# Patient Record
Sex: FEMALE | Race: WHITE | NOT HISPANIC OR LATINO | Employment: UNEMPLOYED | URBAN - METROPOLITAN AREA
[De-identification: names, ages, dates, MRNs, and addresses within clinical notes are randomized per-mention and may not be internally consistent; named-entity substitution may affect disease eponyms.]

---

## 2021-06-07 ENCOUNTER — HISTORICAL (OUTPATIENT)
Dept: ADMINISTRATIVE | Facility: HOSPITAL | Age: 21
End: 2021-06-07

## 2021-06-07 LAB
ABS NEUT (OLG): 3.78 X10(3)/MCL (ref 2.1–9.2)
ALBUMIN SERPL-MCNC: 4.7 GM/DL (ref 3.5–5)
ALBUMIN/GLOB SERPL: 1.3 RATIO (ref 1.1–2)
ALP SERPL-CCNC: 77 UNIT/L (ref 40–150)
ALT SERPL-CCNC: 25 UNIT/L (ref 0–55)
APPEARANCE, UA: CLEAR
AST SERPL-CCNC: 19 UNIT/L (ref 5–34)
BACTERIA #/AREA URNS AUTO: ABNORMAL /HPF
BASOPHILS # BLD AUTO: 0 X10(3)/MCL (ref 0–0.2)
BASOPHILS NFR BLD AUTO: 0 %
BILIRUB SERPL-MCNC: 0.7 MG/DL
BILIRUB UR QL STRIP: NEGATIVE
BILIRUBIN DIRECT+TOT PNL SERPL-MCNC: 0.3 MG/DL (ref 0–0.5)
BILIRUBIN DIRECT+TOT PNL SERPL-MCNC: 0.4 MG/DL (ref 0–0.8)
BUN SERPL-MCNC: 7 MG/DL (ref 7–18.7)
CALCIUM SERPL-MCNC: 10.2 MG/DL (ref 8.4–10.2)
CHLORIDE SERPL-SCNC: 103 MMOL/L (ref 98–107)
CO2 SERPL-SCNC: 26 MMOL/L (ref 22–29)
COLOR UR: ABNORMAL
CREAT SERPL-MCNC: 0.91 MG/DL (ref 0.55–1.02)
EOSINOPHIL # BLD AUTO: 0.1 X10(3)/MCL (ref 0–0.9)
EOSINOPHIL NFR BLD AUTO: 1 %
ERYTHROCYTE [DISTWIDTH] IN BLOOD BY AUTOMATED COUNT: 12.6 % (ref 11.5–14.5)
EST. AVERAGE GLUCOSE BLD GHB EST-MCNC: 99.7 MG/DL
GLOBULIN SER-MCNC: 3.6 GM/DL (ref 2.4–3.5)
GLUCOSE (UA): NEGATIVE
GLUCOSE SERPL-MCNC: 84 MG/DL (ref 74–100)
HBA1C MFR BLD: 5.1 %
HCT VFR BLD AUTO: 46.1 % (ref 35–46)
HGB BLD-MCNC: 14.9 GM/DL (ref 12–16)
HGB UR QL STRIP: NEGATIVE
HYALINE CASTS #/AREA URNS LPF: ABNORMAL /LPF
IMM GRANULOCYTES # BLD AUTO: 0.01 10*3/UL
IMM GRANULOCYTES NFR BLD AUTO: 0 %
KETONES UR QL STRIP: NEGATIVE
LEUKOCYTE ESTERASE UR QL STRIP: 250 LEU/UL
LYMPHOCYTES # BLD AUTO: 1.5 X10(3)/MCL (ref 0.6–4.6)
LYMPHOCYTES NFR BLD AUTO: 26 %
MCH RBC QN AUTO: 29.3 PG (ref 26–34)
MCHC RBC AUTO-ENTMCNC: 32.3 GM/DL (ref 31–37)
MCV RBC AUTO: 90.7 FL (ref 80–100)
MONOCYTES # BLD AUTO: 0.4 X10(3)/MCL (ref 0.1–1.3)
MONOCYTES NFR BLD AUTO: 7 %
NEUTROPHILS # BLD AUTO: 3.78 X10(3)/MCL (ref 2.1–9.2)
NEUTROPHILS NFR BLD AUTO: 66 %
NITRITE UR QL STRIP: NEGATIVE
NRBC BLD AUTO-RTO: 0 % (ref 0–0.2)
PH UR STRIP: 5.5 [PH] (ref 4.5–8)
PLATELET # BLD AUTO: 300 X10(3)/MCL (ref 130–400)
PMV BLD AUTO: 10.5 FL (ref 7.4–10.4)
POC BETA-HCG (QUAL): NEGATIVE
POTASSIUM SERPL-SCNC: 4.1 MMOL/L (ref 3.5–5.1)
PROT SERPL-MCNC: 8.3 GM/DL (ref 6.4–8.3)
PROT UR QL STRIP: NEGATIVE
RBC # BLD AUTO: 5.08 X10(6)/MCL (ref 4–5.2)
RBC #/AREA URNS AUTO: ABNORMAL /HPF
SODIUM SERPL-SCNC: 140 MMOL/L (ref 136–145)
SP GR UR STRIP: 1.01 (ref 1–1.03)
SQUAMOUS #/AREA URNS LPF: ABNORMAL /LPF
T4 FREE SERPL-MCNC: 0.85 NG/DL (ref 0.7–1.48)
TSH SERPL-ACNC: 2.01 UIU/ML (ref 0.35–4.94)
UROBILINOGEN UR STRIP-ACNC: NORMAL
WBC # SPEC AUTO: 5.8 X10(3)/MCL (ref 4.5–11)
WBC #/AREA URNS AUTO: ABNORMAL /HPF

## 2021-08-03 LAB
C TRACH DNA SPEC QL NAA+PROBE: NEGATIVE
HUMAN PAPILLOMAVIRUS (HPV): NORMAL
PAP RECOMMENDATION EXT: ABNORMAL
PAP SMEAR: ABNORMAL
POC BETA-HCG (QUAL): NEGATIVE

## 2021-11-09 ENCOUNTER — HISTORICAL (OUTPATIENT)
Dept: ADMINISTRATIVE | Facility: HOSPITAL | Age: 21
End: 2021-11-09

## 2021-11-09 LAB
B-HCG SERPL QL: NEGATIVE
POC BETA-HCG (QUAL): NEGATIVE

## 2021-11-29 ENCOUNTER — HISTORICAL (OUTPATIENT)
Dept: ADMINISTRATIVE | Facility: HOSPITAL | Age: 21
End: 2021-11-29

## 2021-11-29 LAB
APPEARANCE, UA: CLEAR
BACTERIA #/AREA URNS AUTO: ABNORMAL /HPF
BILIRUB UR QL STRIP: NEGATIVE
COLOR UR: ABNORMAL
FSH SERPL-ACNC: 4.72 MIU/ML
GLUCOSE (UA): NEGATIVE
HGB UR QL STRIP: NEGATIVE
HYALINE CASTS #/AREA URNS LPF: ABNORMAL /LPF
KETONES UR QL STRIP: NEGATIVE
LEUKOCYTE ESTERASE UR QL STRIP: 75 LEU/UL
LH SERPL-ACNC: 4.74 MIU/ML
NITRITE UR QL STRIP: NEGATIVE
PH UR STRIP: 7.5 [PH] (ref 4.5–8)
POC BETA-HCG (QUAL): NEGATIVE
PROLACTIN LEVEL (OHS): 14.48 NG/ML (ref 5.18–26.53)
PROT UR QL STRIP: NEGATIVE
RBC #/AREA URNS AUTO: ABNORMAL /HPF
SP GR UR STRIP: 1 (ref 1–1.03)
SQUAMOUS #/AREA URNS LPF: ABNORMAL /LPF
UROBILINOGEN UR STRIP-ACNC: NORMAL
WBC #/AREA URNS AUTO: ABNORMAL /HPF

## 2022-03-07 LAB
CHOLEST SERPL-MSCNC: 201 MG/DL (ref 0–200)
HDLC SERPL-MCNC: 51 MG/DL (ref 35–70)
LDLC SERPL CALC-MCNC: 135 MG/DL (ref 0–160)
TRIGL SERPL-MCNC: 76 MG/DL (ref 40–160)

## 2022-04-11 ENCOUNTER — HISTORICAL (OUTPATIENT)
Dept: ADMINISTRATIVE | Facility: HOSPITAL | Age: 22
End: 2022-04-11
Payer: MEDICAID

## 2022-04-28 VITALS
DIASTOLIC BLOOD PRESSURE: 69 MMHG | OXYGEN SATURATION: 100 % | WEIGHT: 213.88 LBS | BODY MASS INDEX: 34.37 KG/M2 | SYSTOLIC BLOOD PRESSURE: 122 MMHG | HEIGHT: 66 IN

## 2022-05-04 ENCOUNTER — HOSPITAL ENCOUNTER (OUTPATIENT)
Dept: RADIOLOGY | Facility: HOSPITAL | Age: 22
Discharge: HOME | End: 2022-05-04
Attending: OBSTETRICS & GYNECOLOGY
Payer: MEDICAID

## 2022-05-04 DIAGNOSIS — N91.5 OLIGOMENORRHEA: ICD-10-CM

## 2022-05-04 PROCEDURE — 76830 TRANSVAGINAL US NON-OB: CPT | Mod: TC

## 2022-05-20 ENCOUNTER — OFFICE VISIT (OUTPATIENT)
Dept: FAMILY MEDICINE | Facility: CLINIC | Age: 22
End: 2022-05-20
Payer: MEDICAID

## 2022-05-20 VITALS
RESPIRATION RATE: 20 BRPM | HEIGHT: 66 IN | DIASTOLIC BLOOD PRESSURE: 75 MMHG | WEIGHT: 218 LBS | BODY MASS INDEX: 35.03 KG/M2 | HEART RATE: 64 BPM | TEMPERATURE: 98 F | SYSTOLIC BLOOD PRESSURE: 105 MMHG | OXYGEN SATURATION: 98 %

## 2022-05-20 DIAGNOSIS — F41.9 ANXIETY: ICD-10-CM

## 2022-05-20 DIAGNOSIS — S93.402A SPRAIN OF LEFT ANKLE, UNSPECIFIED LIGAMENT, INITIAL ENCOUNTER: ICD-10-CM

## 2022-05-20 DIAGNOSIS — R11.0 CHRONIC NAUSEA: ICD-10-CM

## 2022-05-20 PROCEDURE — 3074F SYST BP LT 130 MM HG: CPT | Mod: CPTII,,, | Performed by: NURSE PRACTITIONER

## 2022-05-20 PROCEDURE — 99214 OFFICE O/P EST MOD 30 MIN: CPT | Mod: PBBFAC,PN | Performed by: NURSE PRACTITIONER

## 2022-05-20 PROCEDURE — 1159F MED LIST DOCD IN RCRD: CPT | Mod: CPTII,,, | Performed by: NURSE PRACTITIONER

## 2022-05-20 PROCEDURE — 99213 OFFICE O/P EST LOW 20 MIN: CPT | Mod: S$PBB,,, | Performed by: NURSE PRACTITIONER

## 2022-05-20 PROCEDURE — 3008F BODY MASS INDEX DOCD: CPT | Mod: CPTII,,, | Performed by: NURSE PRACTITIONER

## 2022-05-20 PROCEDURE — 1159F PR MEDICATION LIST DOCUMENTED IN MEDICAL RECORD: ICD-10-PCS | Mod: CPTII,,, | Performed by: NURSE PRACTITIONER

## 2022-05-20 PROCEDURE — 1160F PR REVIEW ALL MEDS BY PRESCRIBER/CLIN PHARMACIST DOCUMENTED: ICD-10-PCS | Mod: CPTII,,, | Performed by: NURSE PRACTITIONER

## 2022-05-20 PROCEDURE — 3074F PR MOST RECENT SYSTOLIC BLOOD PRESSURE < 130 MM HG: ICD-10-PCS | Mod: CPTII,,, | Performed by: NURSE PRACTITIONER

## 2022-05-20 PROCEDURE — 3008F PR BODY MASS INDEX (BMI) DOCUMENTED: ICD-10-PCS | Mod: CPTII,,, | Performed by: NURSE PRACTITIONER

## 2022-05-20 PROCEDURE — 3078F PR MOST RECENT DIASTOLIC BLOOD PRESSURE < 80 MM HG: ICD-10-PCS | Mod: CPTII,,, | Performed by: NURSE PRACTITIONER

## 2022-05-20 PROCEDURE — 99213 PR OFFICE/OUTPT VISIT, EST, LEVL III, 20-29 MIN: ICD-10-PCS | Mod: S$PBB,,, | Performed by: NURSE PRACTITIONER

## 2022-05-20 PROCEDURE — 3078F DIAST BP <80 MM HG: CPT | Mod: CPTII,,, | Performed by: NURSE PRACTITIONER

## 2022-05-20 PROCEDURE — 1160F RVW MEDS BY RX/DR IN RCRD: CPT | Mod: CPTII,,, | Performed by: NURSE PRACTITIONER

## 2022-05-20 RX ORDER — ONDANSETRON 8 MG/1
TABLET, ORALLY DISINTEGRATING ORAL
COMMUNITY
Start: 2022-04-21 | End: 2023-02-16 | Stop reason: SDUPTHER

## 2022-05-20 RX ORDER — BUSPIRONE HYDROCHLORIDE 30 MG/1
30 TABLET ORAL 2 TIMES DAILY
COMMUNITY
Start: 2022-04-21 | End: 2022-08-18 | Stop reason: SDUPTHER

## 2022-05-20 RX ORDER — ESCITALOPRAM OXALATE 5 MG/1
5 TABLET ORAL DAILY
COMMUNITY
Start: 2022-04-21 | End: 2022-08-18 | Stop reason: SDUPTHER

## 2022-05-20 RX ORDER — 1.1% SODIUM FLUORIDE PRESCRIPTION DENTAL CREAM 5 MG/G
CREAM DENTAL
COMMUNITY
Start: 2022-04-23

## 2022-05-20 RX ORDER — OMEPRAZOLE 40 MG/1
40 CAPSULE, DELAYED RELEASE ORAL DAILY
COMMUNITY
Start: 2022-04-21 | End: 2023-02-16 | Stop reason: SDUPTHER

## 2022-05-20 NOTE — ASSESSMENT & PLAN NOTE
Continue Lexapro and Buspirone.  Practice deep breathing or abdominal breathing exercises when anxiety occurs.  Exercise daily. Get sunlight daily.  Avoid caffeine, alcohol and stimulants.  Practice positive phrases and repeat throughout the day, yoga, lavender scents or Chamomile tea will help anxiety.  Set healthy boundaries, avoid people and conversations that increase stress.  Reports any symptoms of suicidal or homicidal ideations immediately, if clinic is closed go to nearest emergency room.

## 2022-05-20 NOTE — PROGRESS NOTES
"Patient Name: Sada Cruz   : 2000  MRN: [id number]     SUBJECTIVE:  Sada Cruz is a 22 y.o. female here for No chief complaint on file.    22:  FU 1 month Anxiety  Patient was started on Lexapro at last visit 1 month ago.  She presents today for follow-up and assessment of Lexapro with buspirone.  Patient states Lexapro is working.  Today she also complains of a left ankle sprain.  She initially sprained her ankle in January.  Continued pain to left lateral foot right above the left lateral malleolus.  Denies swelling.  x-ray performed in January at [facility].  No acute abnormality of left ankle demonstrated on x-ray.  Labs in March show FLP with Chol 201, HDL 51, Trig 76, . All other labs unremarkable.     22: FU  Anxiety: DIMPLE 5 today. Thinks needs increase of Buspirone.  Had period last two days of January then went 41 days without one. pregnancy test today is negative.  Nausea: still has issues with nausea.     22: 3 month FU  Light period in December but not spotting or full blown, no birth control since .  Unable to get her Buspirone 15mg TID due to pharmacy issues.   GERD, omeprazole refilled     21: Urgent Care Follow-up  Went to Urgent Care with symptoms of pregnancy and both urine and serum tests negative.  Hx Anxiety, recently seen 10/5 and put on Buspirone 10mg po TID.   Stopped OCP in , was having regular periods after stopping until last month. Had "spotting" at 10 days late, then spotted for 1.5 days at the end of October. Reports nausea in the morning and at bedtime, denies vomiting. Reports intermittent generalized abdominal aching. Denies diarrhea, constipation, or vomiting.  Next GYN Appt here .    10/5/2021: 1 month follow-up of anxiety.  This is a telehealth visit note. Patient was treated using telehealth, real time audio, video, or both according to [facility] protocols. Sophia RODRIGUEZ FNP-C, conducted the visit from location identified below. " "The patient participated in the visit at a non-[facility] location selected by the patient (or patient's representative), identified below. I am licensed in the [facility]. The patient (or patient's representative) stated that they understood and accepted the privacy and security risks to their information at their location and has consented to telephone visit.  Patient was located at patient's home.  Sophia RODRIGUEZ FNP-C, was located at [facility] [address] [place] [address], [place], [place].  Anxiety: Buspirone TID prescribed last visit. less agitation and not freaking out as much. She has no complaints and states she is doing well today.    9/8/2021: This is a telehealth visit note. Patient was treated using telehealth, real time audio, video, or both according to [facility] protocols. Sophia RODRIGUEZ FNP-C, conducted the visit from location identified below. The patient participated in the visit at a non-[facility] location selected by the patient (or patient's representative), identified below. I am licensed in the [facility]. The patient (or patient's representative) stated that they understood and accepted the privacy and security risks to their information at their location and has consented to telephone visit.  Patient was located at patient's home.  Sophia RODRIGUEZ FNP-C, was located at [address] [place] [address], [place], [place].  Today's visit is follow-up from initial visit.  Was trying to conceive at last visit pregnancy test was negative. She also had one done 8/3 which was negative. Labs reviewed from last visit all WNL.  pt states she has been feeling a lot of anxiety lately and states she has been "freaking out" a lot. neighbor started an argument with her boyfriend and she had bad chest pain and felt anxious because they were arguing. She has mood swings which are normal but no increased agitation, only c/o stress and feelings of worry. She has been off of birth control x 2 months. Has not " been able to conceive.    6/1/2021: Complaints today: Here to establish care.  Believes she is pregnant. LMP 5/21. Stopped BCP 2 weeks ago. Is trying to conceive. has nausea before bed and in AM.  Cancer Screening:  PAP: 2019: normal needs referral to GYN here.  Vaccines:  Tetanus/Tdap: UTD      The patient's allergies, medications, history, and problem list were updated as appropriate.    REVIEW OF SYSTEMS:  A comprehensive review of symptoms was completed and negative except as noted in the HPI.    OBJECTIVE:  Vital signs  There were no vitals filed for this visit.     Physical Exam  Vitals and nursing note reviewed.   HENT:      Head: Normocephalic and atraumatic.   Cardiovascular:      Rate and Rhythm: Normal rate and regular rhythm.      Pulses: Normal pulses.      Heart sounds: Normal heart sounds.   Pulmonary:      Breath sounds: Normal breath sounds.   Musculoskeletal:         General: Normal range of motion.      Cervical back: Normal range of motion.   Skin:     General: Skin is warm and dry.   Neurological:      General: No focal deficit present.      Mental Status: She is alert and oriented to person, place, and time.   Psychiatric:         Mood and Affect: Mood normal.          ASSESSMENT/PLAN:  1. Anxiety  Assessment & Plan:  Continue Lexapro and Buspirone.  Practice deep breathing or abdominal breathing exercises when anxiety occurs.  Exercise daily. Get sunlight daily.  Avoid caffeine, alcohol and stimulants.  Practice positive phrases and repeat throughout the day, yoga, lavender scents or Chamomile tea will help anxiety.  Set healthy boundaries, avoid people and conversations that increase stress.  Reports any symptoms of suicidal or homicidal ideations immediately, if clinic is closed go to nearest emergency room.          2. Chronic nausea  Assessment & Plan:  Awaiting GI appt       No results found for this or any previous visit (from the past 24 hour(s)).      Follow Up:  No follow-ups on file.      This note was created with the assistance of a voice recognition software or phone dictation. There may be transcription errors as a result of using this technology however minimal. Effort has been made to assure accuracy of transcription but any obvious errors or omissions should be clarified with the author of the document

## 2022-05-23 DIAGNOSIS — R63.0 LOSS OF APPETITE: Primary | ICD-10-CM

## 2022-05-31 DIAGNOSIS — N91.5 OLIGOMENORRHEA, UNSPECIFIED TYPE: Primary | ICD-10-CM

## 2022-05-31 NOTE — PROGRESS NOTES
Patient had telemed on 5/25/22 with Gyn res clinic to go over pelvic US & lab results. Pelvic US was completed but the lab work was not. Abstracted the lab orders from [facility] into [employer]. Patient states when she comes in for another US on 6/2/22 she will get the lab work completed then. Abstracted orders are in. Verbal okay to put the orders under Dr. Kira Nieves per Dr. Kira Nieves.

## 2022-06-02 ENCOUNTER — HOSPITAL ENCOUNTER (OUTPATIENT)
Dept: RADIOLOGY | Facility: HOSPITAL | Age: 22
Discharge: HOME | End: 2022-06-02
Attending: INTERNAL MEDICINE
Payer: MEDICAID

## 2022-06-02 DIAGNOSIS — R63.0 LOSS OF APPETITE: ICD-10-CM

## 2022-06-02 PROCEDURE — 76700 US EXAM ABDOM COMPLETE: CPT | Mod: TC

## 2022-06-07 DIAGNOSIS — R63.0 LOSS OF APPETITE: Primary | ICD-10-CM

## 2022-06-07 DIAGNOSIS — R11.0 NAUSEA: ICD-10-CM

## 2022-06-07 DIAGNOSIS — R10.33 PERIUMBILICAL PAIN: ICD-10-CM

## 2022-06-13 ENCOUNTER — HOSPITAL ENCOUNTER (OUTPATIENT)
Dept: RADIOLOGY | Facility: HOSPITAL | Age: 22
Discharge: HOME | End: 2022-06-13
Attending: NURSE PRACTITIONER
Payer: MEDICAID

## 2022-06-13 DIAGNOSIS — R10.33 PERIUMBILICAL PAIN: ICD-10-CM

## 2022-06-13 DIAGNOSIS — R63.0 LOSS OF APPETITE: ICD-10-CM

## 2022-06-13 DIAGNOSIS — R11.0 NAUSEA: ICD-10-CM

## 2022-06-13 PROCEDURE — A9537 TC99M MEBROFENIN: HCPCS

## 2022-06-13 PROCEDURE — 78226 HEPATOBILIARY SYSTEM IMAGING: CPT | Mod: TC

## 2022-06-24 ENCOUNTER — HOSPITAL ENCOUNTER (OUTPATIENT)
Dept: RADIOLOGY | Facility: HOSPITAL | Age: 22
Discharge: HOME | End: 2022-06-24
Attending: FAMILY MEDICINE
Payer: MEDICAID

## 2022-06-24 ENCOUNTER — OFFICE VISIT (OUTPATIENT)
Dept: ORTHOPEDICS | Facility: CLINIC | Age: 22
End: 2022-06-24
Payer: MEDICAID

## 2022-06-24 VITALS
HEIGHT: 69 IN | WEIGHT: 217.19 LBS | BODY MASS INDEX: 32.17 KG/M2 | HEART RATE: 63 BPM | DIASTOLIC BLOOD PRESSURE: 76 MMHG | SYSTOLIC BLOOD PRESSURE: 111 MMHG

## 2022-06-24 DIAGNOSIS — M67.88 LEFT PERONEAL TENDONOSIS: Primary | ICD-10-CM

## 2022-06-24 DIAGNOSIS — M25.572 ACUTE LEFT ANKLE PAIN: ICD-10-CM

## 2022-06-24 DIAGNOSIS — S93.402A SPRAIN OF LEFT ANKLE, UNSPECIFIED LIGAMENT, INITIAL ENCOUNTER: ICD-10-CM

## 2022-06-24 PROCEDURE — 99214 OFFICE O/P EST MOD 30 MIN: CPT | Mod: PBBFAC

## 2022-06-24 PROCEDURE — 73610 X-RAY EXAM OF ANKLE: CPT | Mod: TC,LT

## 2022-06-24 RX ORDER — MELOXICAM 7.5 MG/1
7.5 TABLET ORAL DAILY
Qty: 14 TABLET | Refills: 0 | Status: SHIPPED | OUTPATIENT
Start: 2022-06-24 | End: 2022-07-08

## 2022-06-24 RX ORDER — DICLOFENAC SODIUM 10 MG/G
2 GEL TOPICAL 4 TIMES DAILY PRN
Qty: 100 G | Refills: 3 | Status: SHIPPED | OUTPATIENT
Start: 2022-06-24 | End: 2023-06-12

## 2022-06-24 NOTE — PROGRESS NOTES
Subjective:      Patient ID: Sada Cruz is a 22 y.o. female.    Chief Complaint: Pain of the Left Ankle    HPI  Left ankle pain for 6 months after spraining her ankle while walking her dog. Since then, she notes that her left ankle will sometimes hurt after a full day on her feet. Denies any new trauma, swelling, weakness. Is able to walk without difficulty. Denies tingling/numbness.     Review of Systems   All other systems reviewed and are negative.        Objective:            General    Constitutional: She is oriented to person, place, and time. She appears well-developed and well-nourished.   Neurological: She is alert and oriented to person, place, and time.   Psychiatric: She has a normal mood and affect. Her behavior is normal.     General Musculoskeletal Exam   Gait: normal     Right Ankle/Foot Exam   Right ankle exam is normal.    Left Ankle/Foot Exam     Inspection  Deformity: absent  Effusion: Ankle - absent Foot - absent    Tenderness   The patient is tender to palpation of the peroneals.    Range of Motion   The patient has normal left ankle ROM.     Muscle Strength   The patient has normal left ankle strength.    Tests   Anterior drawer: negative  Varus tilt: negative    Other   Sensation: normal        Xray Left ankle 3 views today (6/24/22)  My impression: No acute osseous abnormalities        Assessment:       Encounter Diagnoses   Name Primary?    Sprain of left ankle, unspecified ligament, initial encounter     Left peroneal tendonosis Yes          Plan:       Sada was seen today for pain.    Diagnoses and all orders for this visit:    Left peroneal tendonosis  -     X-Ray Ankle Complete Left; Future    Sprain of left ankle, unspecified ligament, initial encounter  -     Ambulatory referral/consult to Orthopedics      Dx: left peroneal tendonosis, secondary to prior ankle sprain. Acute in moderate exacerbation. Discussed with patient diagnosis and treatment recommendations. Handout given.    Imaging: radiological studies ordered and independently reviewed; discussed with patient; radiologist interpretation pending .   Treatment Plan: Conservative MELANIA therapy (Protect from further injury, relative rest, Ice or Heat, NSAIDs or Tylenol, Compress and Elevate to reduce swelling, Stretches and Strengthening exercises).   Procedure: will consider CSI in the future if conservative treatments do not improve symptoms  Activity: Activity as tolerated; HEP to include aerobic conditioning with non-painful activity and ROM/STG exercises.   Therapy: Physical Therapy  Medication: start mobic 7.5mg daily. start voltaren gel TID PRN; medication precautions given  RTC: PRN; As needed  Additional Workup: none

## 2022-07-01 NOTE — PROGRESS NOTES
Faculty Attestation: Sada Cruz  was seen in Sports Medicine Clinic. Discussed with Dr. Cline at the time of the visit. History of Present Illness, Physical Exam, and Assessment and Plan reviewed. Treatment plan is reasonable and appropriate. Compliance with treatment recommendations is important.  Radiology images independently reviewed and agree with radiologist interpretation.       Jania Reza MD  Family/Sports Medicine

## 2022-08-18 ENCOUNTER — OFFICE VISIT (OUTPATIENT)
Dept: FAMILY MEDICINE | Facility: CLINIC | Age: 22
End: 2022-08-18
Payer: MEDICAID

## 2022-08-18 VITALS
TEMPERATURE: 99 F | OXYGEN SATURATION: 99 % | WEIGHT: 212 LBS | HEART RATE: 66 BPM | DIASTOLIC BLOOD PRESSURE: 74 MMHG | BODY MASS INDEX: 33.27 KG/M2 | HEIGHT: 67 IN | RESPIRATION RATE: 20 BRPM | SYSTOLIC BLOOD PRESSURE: 103 MMHG

## 2022-08-18 DIAGNOSIS — F41.9 ANXIETY: Primary | ICD-10-CM

## 2022-08-18 DIAGNOSIS — R11.0 CHRONIC NAUSEA: ICD-10-CM

## 2022-08-18 DIAGNOSIS — Z00.00 ENCOUNTER FOR WELLNESS EXAMINATION: ICD-10-CM

## 2022-08-18 PROBLEM — S93.402A LEFT ANKLE SPRAIN: Status: RESOLVED | Noted: 2022-05-20 | Resolved: 2022-08-18

## 2022-08-18 PROCEDURE — 99214 OFFICE O/P EST MOD 30 MIN: CPT | Mod: S$PBB,,, | Performed by: NURSE PRACTITIONER

## 2022-08-18 PROCEDURE — 1159F PR MEDICATION LIST DOCUMENTED IN MEDICAL RECORD: ICD-10-PCS | Mod: CPTII,,, | Performed by: NURSE PRACTITIONER

## 2022-08-18 PROCEDURE — 99214 PR OFFICE/OUTPT VISIT, EST, LEVL IV, 30-39 MIN: ICD-10-PCS | Mod: S$PBB,,, | Performed by: NURSE PRACTITIONER

## 2022-08-18 PROCEDURE — 3074F SYST BP LT 130 MM HG: CPT | Mod: CPTII,,, | Performed by: NURSE PRACTITIONER

## 2022-08-18 PROCEDURE — 1160F PR REVIEW ALL MEDS BY PRESCRIBER/CLIN PHARMACIST DOCUMENTED: ICD-10-PCS | Mod: CPTII,,, | Performed by: NURSE PRACTITIONER

## 2022-08-18 PROCEDURE — 3078F DIAST BP <80 MM HG: CPT | Mod: CPTII,,, | Performed by: NURSE PRACTITIONER

## 2022-08-18 PROCEDURE — 3074F PR MOST RECENT SYSTOLIC BLOOD PRESSURE < 130 MM HG: ICD-10-PCS | Mod: CPTII,,, | Performed by: NURSE PRACTITIONER

## 2022-08-18 PROCEDURE — 1160F RVW MEDS BY RX/DR IN RCRD: CPT | Mod: CPTII,,, | Performed by: NURSE PRACTITIONER

## 2022-08-18 PROCEDURE — 3008F BODY MASS INDEX DOCD: CPT | Mod: CPTII,,, | Performed by: NURSE PRACTITIONER

## 2022-08-18 PROCEDURE — 99214 OFFICE O/P EST MOD 30 MIN: CPT | Mod: PBBFAC,PN | Performed by: NURSE PRACTITIONER

## 2022-08-18 PROCEDURE — 1159F MED LIST DOCD IN RCRD: CPT | Mod: CPTII,,, | Performed by: NURSE PRACTITIONER

## 2022-08-18 PROCEDURE — 3078F PR MOST RECENT DIASTOLIC BLOOD PRESSURE < 80 MM HG: ICD-10-PCS | Mod: CPTII,,, | Performed by: NURSE PRACTITIONER

## 2022-08-18 PROCEDURE — 3008F PR BODY MASS INDEX (BMI) DOCUMENTED: ICD-10-PCS | Mod: CPTII,,, | Performed by: NURSE PRACTITIONER

## 2022-08-18 RX ORDER — BUSPIRONE HYDROCHLORIDE 30 MG/1
30 TABLET ORAL 2 TIMES DAILY
Qty: 60 TABLET | Refills: 6 | Status: SHIPPED | OUTPATIENT
Start: 2022-08-18 | End: 2023-02-16 | Stop reason: SDUPTHER

## 2022-08-18 RX ORDER — ESCITALOPRAM OXALATE 5 MG/1
5 TABLET ORAL DAILY
Qty: 30 TABLET | Refills: 6 | Status: SHIPPED | OUTPATIENT
Start: 2022-08-18 | End: 2023-02-16 | Stop reason: SDUPTHER

## 2022-08-18 RX ORDER — ONDANSETRON 4 MG/1
4 TABLET, ORALLY DISINTEGRATING ORAL EVERY 8 HOURS PRN
COMMUNITY
End: 2022-08-18 | Stop reason: DRUGHIGH

## 2022-08-18 NOTE — PROGRESS NOTES
Patient Name: Sada Cruz   : 2000  MRN: [id number]     SUBJECTIVE:  Sada Cruz is a 22 y.o. female here for Follow-up (3 month f/u, refill)      22: FU visit for assessment of increase in Buspirone and Lexapro.    Lexapro working well and Buspirone increase has helped her.  She has no c/o depression or anxiety today. Is smiling during visit.  States she feels a lot better since her gall bladder was removed recently.  Does have FU scheduled with surgeon at [facility].   States one of her incisions got infected and was not healing correctly.  She is having to pack wound currently. Voices no other complaints today. Most recent labs done in  reviewed and are unremarkable.   Has been to ortho for her ankle.  Issue seems resolved.    Needs refill on her Lexapro and her Buspirone.     22:  FU 1 month Anxiety  Patient was started on Lexapro at last visit 1 month ago.  She presents today for follow-up and assessment of Lexapro with buspirone.  Patient states Lexapro is working.  Today she also complains of a left ankle sprain.  She initially sprained her ankle in January.  Continued pain to left lateral foot right above the left lateral malleolus.  Denies swelling.  x-ray performed in January at [facility].  No acute abnormality of left ankle demonstrated on x-ray.  Labs in March show FLP with Chol 201, HDL 51, Trig 76, . All other labs unremarkable.     22: FU  Anxiety: DIMPLE 5 today. Thinks needs increase of Buspirone.  Had period last two days of January then went 41 days without one. pregnancy test today is negative.  Nausea: still has issues with nausea.     22: 3 month FU  Light period in December but not spotting or full blown, no birth control since .  Unable to get her Buspirone 15mg TID due to pharmacy issues.   GERD, omeprazole refilled     21: Urgent Care Follow-up  Went to Urgent Care with symptoms of pregnancy and both urine and serum tests negative.  Hx Anxiety,  "recently seen 10/5 and put on Buspirone 10mg po TID.   Stopped OCP in June, was having regular periods after stopping until last month. Had "spotting" at 10 days late, then spotted for 1.5 days at the end of October. Reports nausea in the morning and at bedtime, denies vomiting. Reports intermittent generalized abdominal aching. Denies diarrhea, constipation, or vomiting.  Next GYN Appt here 8/22.    10/5/2021: 1 month follow-up of anxiety.  This is a telehealth visit note. Patient was treated using telehealth, real time audio, video, or both according to [facility] protocols. Sophia RODRIGUEZ FNP-C, conducted the visit from location identified below. The patient participated in the visit at a non-OU location selected by the patient (or patient's representative), identified below. I am licensed in the [facility]. The patient (or patient's representative) stated that they understood and accepted the privacy and security risks to their information at their location and has consented to telephone visit.  Patient was located at patient's home.  Sophia RODRIGUEZ FNP-C, was located at [facility] [address].  Anxiety: Buspirone TID prescribed last visit. less agitation and not freaking out as much. She has no complaints and states she is doing well today.    9/8/2021: This is a telehealth visit note. Patient was treated using telehealth, real time audio, video, or both according to [facility] protocols. Sophia RODRIGUEZ FNP-C, conducted the visit from location identified below. The patient participated in the visit at a non-[facility] location selected by the patient (or patient's representative), identified below. I am licensed in the [facility]. The patient (or patient's representative) stated that they understood and accepted the privacy and security risks to their information at their location and has consented to telephone visit.  Patient was located at patient's home.  Sophia RODRIGUEZ, " "LUISKARI, was located at [facility] [address], [place], [place].  Today's visit is follow-up from initial visit.  Was trying to conceive at last visit pregnancy test was negative. She also had one done 8/3 which was negative. Labs reviewed from last visit all WNL.  pt states she has been feeling a lot of anxiety lately and states she has been "freaking out" a lot. neighbor started an argument with her boyfriend and she had bad chest pain and felt anxious because they were arguing. She has mood swings which are normal but no increased agitation, only c/o stress and feelings of worry. She has been off of birth control x 2 months. Has not been able to conceive.    6/1/2021: Complaints today: Here to establish care.  Believes she is pregnant. LMP 5/21. Stopped BCP 2 weeks ago. Is trying to conceive. has nausea before bed and in AM.  Cancer Screening:  PAP: 2019: normal needs referral to GYN here.  Vaccines:  Tetanus/Tdap: UTD            ALLERGIES: Review of patient's allergies indicates:  No Known Allergies      ROS:  Review of Systems   HENT: Negative for hearing loss.    Eyes: Negative for discharge.   Respiratory: Negative for wheezing.    Cardiovascular: Negative for chest pain and palpitations.   Gastrointestinal: Positive for nausea. Negative for blood in stool, constipation, diarrhea and vomiting.   Genitourinary: Negative for dysuria and hematuria.   Musculoskeletal: Negative for neck pain.   Neurological: Negative for weakness and headaches.   Endo/Heme/Allergies: Negative for polydipsia.         OBJECTIVE:  Vital signs  Vitals:    08/18/22 0710   BP: 103/74   BP Location: Left arm   Patient Position: Sitting   BP Method: Medium (Automatic)   Pulse: 66   Resp: 20   Temp: 98.7 °F (37.1 °C)   TempSrc: Oral   SpO2: 99%   Weight: 96.2 kg (212 lb)   Height: 5' 7" (1.702 m)      Body mass index is 33.2 kg/m².    PHYSICAL EXAM:   Physical Exam  Vitals and nursing note reviewed.   HENT:      Head: " Normocephalic and atraumatic.   Cardiovascular:      Rate and Rhythm: Normal rate and regular rhythm.      Pulses: Normal pulses.      Heart sounds: Normal heart sounds.   Pulmonary:      Breath sounds: Normal breath sounds.   Musculoskeletal:         General: Normal range of motion.      Cervical back: Normal range of motion.   Skin:     General: Skin is warm and dry.   Neurological:      General: No focal deficit present.      Mental Status: She is alert and oriented to person, place, and time.   Psychiatric:         Mood and Affect: Mood normal.          ASSESSMENT/PLAN:  1. Anxiety  Assessment & Plan:  Continue Lexapro 5mg and Buspirone 30mg BID  Practice deep breathing or abdominal breathing exercises when anxiety occurs.  Exercise daily. Get sunlight daily.  Avoid caffeine, alcohol and stimulants.  Practice positive phrases and repeat throughout the day, yoga, lavender scents or Chamomile tea will help anxiety.  Set healthy boundaries, avoid people and conversations that increase stress.  Reports any symptoms of suicidal or homicidal ideations immediately, if clinic is closed go to nearest emergency room.        Orders:  -     EScitalopram oxalate (LEXAPRO) 5 MG Tab  -     busPIRone (BUSPAR) 30 MG Tab    2. Chronic nausea  Assessment & Plan:  FU with GI for further mgmt of nausea.       3. Encounter for wellness examination  -     CBC Auto Differential  -     Comprehensive Metabolic Panel  -     TSH  -     T4, Free  -     Lipid Panel  -     Urinalysis  -     Hemoglobin A1C  -     Hepatitis Panel, Acute  -     Hepatitis B Core Antibody, Total  -     Hepatitis B Surface Antigen  -     HIV 1/2 Ag/Ab (4th Gen)  -     SYPHILIS ANTIBODY (WITH REFLEX RPR)         RESULTS:  No results found for this or any previous visit (from the past 1008 hour(s)).      Follow Up:  Follow up in about 6 months (around 2/18/2023) for Follow-up.         This note was created with the assistance of a voice recognition software or phone  dictation. There may be transcription errors as a result of using this technology however minimal. Effort has been made to assure accuracy of transcription but any obvious errors or omissions should be clarified with the author of the document

## 2022-08-18 NOTE — ASSESSMENT & PLAN NOTE
Continue Lexapro 5mg and Buspirone 30mg BID  Practice deep breathing or abdominal breathing exercises when anxiety occurs.  Exercise daily. Get sunlight daily.  Avoid caffeine, alcohol and stimulants.  Practice positive phrases and repeat throughout the day, yoga, lavender scents or Chamomile tea will help anxiety.  Set healthy boundaries, avoid people and conversations that increase stress.  Reports any symptoms of suicidal or homicidal ideations immediately, if clinic is closed go to nearest emergency room.

## 2022-09-12 ENCOUNTER — CLINICAL SUPPORT (OUTPATIENT)
Dept: GYNECOLOGY | Facility: CLINIC | Age: 22
End: 2022-09-12
Payer: MEDICAID

## 2022-09-12 DIAGNOSIS — E28.2 PCOS (POLYCYSTIC OVARIAN SYNDROME): Primary | ICD-10-CM

## 2022-09-12 RX ORDER — METFORMIN HYDROCHLORIDE 500 MG/1
500 TABLET ORAL NIGHTLY
Qty: 90 TABLET | Refills: 3 | Status: SHIPPED | OUTPATIENT
Start: 2022-09-12 | End: 2023-02-16

## 2022-09-12 NOTE — PROGRESS NOTES
Established Patient - Audio Only Telehealth Visit     The patient location is: Home   The chief complaint leading to consultation is: F/u of oligomenorrhea, review of TVUS results   Visit type: Virtual visit with audio only (telephone)  Total time spent with patient: 7 minutes        The reason for the audio only service rather than synchronous audio and video virtual visit was related to technical difficulties or patient preference/necessity.     Each patient to whom I provide medical services by telemedicine is:  (1) informed of the relationship between the physician and patient and the respective role of any other health care provider with respect to management of the patient; and (2) notified that they may decline to receive medical services by telemedicine and may withdraw from such care at any time. Patient verbally consented to receive this service via voice-only telephone call.       HPI:     22 y.o. G0 with oligomenorrhea presenting via telehealth for discussion of TVUS results, today the pt has no complaints. States that since her last visit, she has been experiencing regular monthly menses. Denies any issues with abnormal hair growth, hair loss, voice deepening, or difficult to treat acne. Reports that she has not been successful at losing weight. Is interested in conceiving in the near future but not at this time. No other complaints.    Assessment and plan:   Reviewed TVUS results revealing large polycystic ovaries, and given her hx of oligomenorrhea, pt meets criteria for PCOS.    PCOS  - 2 hr OGTT, lipid panel ordered   - Discussed weight loss strategies for optimization of glucose control and fertility  - Initiated on metformin 500 mg qhs, pt instructed to gradually increase to 1500 daily    - Pt currently with cyclical menses and interested in becoming pregnant in near future, will not initiate Progestin at this time  - Discussed that pt may need ART in order to become pregnant, will revisit at a  later time when patient and partner feel ready to conceive     RTC in 6 months for reevaluation     This service was not originating from a related E/M service provided within the previous 7 days nor will  to an E/M service or procedure within the next 24 hours or my soonest available appointment.  Prevailing standard of care was able to be met in this audio-only visit.      Pt and plan discussed with Dr. Cheyenne Batista MD   [employer] [facility] PGY-2

## 2022-09-14 ENCOUNTER — TELEPHONE (OUTPATIENT)
Dept: GYNECOLOGY | Facility: CLINIC | Age: 22
End: 2022-09-14
Payer: MEDICAID

## 2022-09-14 NOTE — TELEPHONE ENCOUNTER
Danette JAMES [facility] tech calling for clarification of metformin prescription.  Spoke with Dr Batista and received these instructions:  Metformin 500mg q night for 2 weeks the increase to Metformin 500mg twice a day for one week then increase to Metformin 500mg three times a day thereafter

## 2022-09-22 ENCOUNTER — HISTORICAL (OUTPATIENT)
Dept: ADMINISTRATIVE | Facility: HOSPITAL | Age: 22
End: 2022-09-22
Payer: MEDICAID

## 2022-12-06 ENCOUNTER — DOCUMENTATION ONLY (OUTPATIENT)
Dept: ADMINISTRATIVE | Facility: HOSPITAL | Age: 22
End: 2022-12-06
Payer: MEDICAID

## 2022-12-23 ENCOUNTER — TELEPHONE (OUTPATIENT)
Dept: GYNECOLOGY | Facility: CLINIC | Age: 22
End: 2022-12-23
Payer: MEDICAID

## 2022-12-23 NOTE — TELEPHONE ENCOUNTER
----- Message from Che Bowman sent at 12/22/2022  3:35 PM CST -----  Regarding: Problems with medication  Patient called in stating that she is having problems with the medication she is currently on. Patient did not state the medication name.  Patient experiencing diarrhea frequently during the day with Metformin.  I instructed her to stop it and I would notify provider

## 2023-01-25 ENCOUNTER — TELEPHONE (OUTPATIENT)
Dept: FAMILY MEDICINE | Facility: CLINIC | Age: 23
End: 2023-01-25
Payer: MEDICAID

## 2023-01-30 NOTE — TELEPHONE ENCOUNTER
Called patient, and notified her that she has labs to be collected at her next visit, she will R/S when she has a ride to come in.

## 2023-02-02 ENCOUNTER — CLINICAL SUPPORT (OUTPATIENT)
Dept: FAMILY MEDICINE | Facility: CLINIC | Age: 23
End: 2023-02-02
Payer: MEDICAID

## 2023-02-02 DIAGNOSIS — Z00.00 ENCOUNTER FOR WELLNESS EXAMINATION: ICD-10-CM

## 2023-02-02 LAB
ALBUMIN SERPL-MCNC: 4.6 G/DL (ref 3.5–5)
ALBUMIN/GLOB SERPL: 1.4 RATIO (ref 1.1–2)
ALP SERPL-CCNC: 70 UNIT/L (ref 40–150)
ALT SERPL-CCNC: 17 UNIT/L (ref 0–55)
APPEARANCE UR: ABNORMAL
AST SERPL-CCNC: 17 UNIT/L (ref 5–34)
BACTERIA #/AREA URNS AUTO: ABNORMAL /HPF
BASOPHILS # BLD AUTO: 0.04 X10(3)/MCL (ref 0–0.2)
BASOPHILS NFR BLD AUTO: 0.7 %
BILIRUB UR QL STRIP.AUTO: NEGATIVE MG/DL
BILIRUBIN DIRECT+TOT PNL SERPL-MCNC: 0.7 MG/DL
BUN SERPL-MCNC: 11.4 MG/DL (ref 7–18.7)
CALCIUM SERPL-MCNC: 9.9 MG/DL (ref 8.4–10.2)
CHLORIDE SERPL-SCNC: 106 MMOL/L (ref 98–107)
CHOLEST SERPL-MCNC: 204 MG/DL
CHOLEST/HDLC SERPL: 4 {RATIO} (ref 0–5)
CO2 SERPL-SCNC: 23 MMOL/L (ref 22–29)
COLOR UR AUTO: ABNORMAL
CREAT SERPL-MCNC: 0.84 MG/DL (ref 0.55–1.02)
EOSINOPHIL # BLD AUTO: 0.05 X10(3)/MCL (ref 0–0.9)
EOSINOPHIL NFR BLD AUTO: 0.9 %
ERYTHROCYTE [DISTWIDTH] IN BLOOD BY AUTOMATED COUNT: 13.2 % (ref 11.5–17)
EST. AVERAGE GLUCOSE BLD GHB EST-MCNC: 99.7 MG/DL
GFR SERPLBLD CREATININE-BSD FMLA CKD-EPI: >60 MLS/MIN/1.73/M2
GLOBULIN SER-MCNC: 3.3 GM/DL (ref 2.4–3.5)
GLUCOSE SERPL-MCNC: 78 MG/DL (ref 74–100)
GLUCOSE UR QL STRIP.AUTO: NORMAL MG/DL
HAV IGM SERPL QL IA: NONREACTIVE
HBA1C MFR BLD: 5.1 %
HBV CORE AB SERPL QL IA: NONREACTIVE
HBV CORE IGM SERPL QL IA: NONREACTIVE
HBV SURFACE AG SERPL QL IA: NONREACTIVE
HCT VFR BLD AUTO: 41.3 % (ref 37–47)
HCV AB SERPL QL IA: NONREACTIVE
HDLC SERPL-MCNC: 51 MG/DL (ref 35–60)
HGB BLD-MCNC: 13.8 GM/DL (ref 12–16)
HIV 1+2 AB+HIV1 P24 AG SERPL QL IA: NONREACTIVE
HYALINE CASTS #/AREA URNS LPF: ABNORMAL /LPF
IMM GRANULOCYTES # BLD AUTO: 0.01 X10(3)/MCL (ref 0–0.04)
IMM GRANULOCYTES NFR BLD AUTO: 0.2 %
KETONES UR QL STRIP.AUTO: NEGATIVE MG/DL
LDLC SERPL CALC-MCNC: 141 MG/DL (ref 50–140)
LEUKOCYTE ESTERASE UR QL STRIP.AUTO: 500 UNIT/L
LYMPHOCYTES # BLD AUTO: 1.95 X10(3)/MCL (ref 0.6–4.6)
LYMPHOCYTES NFR BLD AUTO: 33.3 %
MCH RBC QN AUTO: 28.9 PG
MCHC RBC AUTO-ENTMCNC: 33.4 MG/DL (ref 33–36)
MCV RBC AUTO: 86.4 FL (ref 80–94)
MONOCYTES # BLD AUTO: 0.4 X10(3)/MCL (ref 0.1–1.3)
MONOCYTES NFR BLD AUTO: 6.8 %
MUCOUS THREADS URNS QL MICRO: ABNORMAL /LPF
NEUTROPHILS # BLD AUTO: 3.4 X10(3)/MCL (ref 2.1–9.2)
NEUTROPHILS NFR BLD AUTO: 58.1 %
NITRITE UR QL STRIP.AUTO: NEGATIVE
NRBC BLD AUTO-RTO: 0 %
PH UR STRIP.AUTO: 5.5 [PH]
PLATELET # BLD AUTO: 321 X10(3)/MCL (ref 130–400)
PMV BLD AUTO: 9.9 FL (ref 7.4–10.4)
POTASSIUM SERPL-SCNC: 4.8 MMOL/L (ref 3.5–5.1)
PROT SERPL-MCNC: 7.9 GM/DL (ref 6.4–8.3)
PROT UR QL STRIP.AUTO: NEGATIVE MG/DL
RBC # BLD AUTO: 4.78 X10(6)/MCL (ref 4.2–5.4)
RBC #/AREA URNS AUTO: ABNORMAL /HPF
RBC UR QL AUTO: NEGATIVE UNIT/L
SODIUM SERPL-SCNC: 137 MMOL/L (ref 136–145)
SP GR UR STRIP.AUTO: 1.02
SPERM URNS QL MICRO: ABNORMAL /HPF
SQUAMOUS #/AREA URNS LPF: ABNORMAL /HPF
T PALLIDUM AB SER QL: NONREACTIVE
T4 FREE SERPL-MCNC: 0.86 NG/DL (ref 0.7–1.48)
TRIGL SERPL-MCNC: 59 MG/DL (ref 37–140)
TSH SERPL-ACNC: 2.02 UIU/ML (ref 0.35–4.94)
UNIDENT CRYS #/AREA URNS HPF: ABNORMAL /HPF
UROBILINOGEN UR STRIP-ACNC: NORMAL MG/DL
VLDLC SERPL CALC-MCNC: 12 MG/DL
WBC # SPEC AUTO: 5.9 X10(3)/MCL (ref 4.5–11.5)
WBC #/AREA URNS AUTO: ABNORMAL /HPF
WBC CLUMPS UR QL AUTO: ABNORMAL /HPF
YEAST BUDDING URNS QL: ABNORMAL /HPF

## 2023-02-02 PROCEDURE — 80074 ACUTE HEPATITIS PANEL: CPT

## 2023-02-02 PROCEDURE — 85025 COMPLETE CBC W/AUTO DIFF WBC: CPT

## 2023-02-02 PROCEDURE — 87389 HIV-1 AG W/HIV-1&-2 AB AG IA: CPT

## 2023-02-02 PROCEDURE — 84443 ASSAY THYROID STIM HORMONE: CPT

## 2023-02-02 PROCEDURE — 86704 HEP B CORE ANTIBODY TOTAL: CPT

## 2023-02-02 PROCEDURE — 84439 ASSAY OF FREE THYROXINE: CPT

## 2023-02-02 PROCEDURE — 83036 HEMOGLOBIN GLYCOSYLATED A1C: CPT

## 2023-02-02 PROCEDURE — 80061 LIPID PANEL: CPT

## 2023-02-02 PROCEDURE — 80053 COMPREHEN METABOLIC PANEL: CPT

## 2023-02-02 PROCEDURE — 81001 URINALYSIS AUTO W/SCOPE: CPT

## 2023-02-02 PROCEDURE — 36415 COLL VENOUS BLD VENIPUNCTURE: CPT

## 2023-02-02 PROCEDURE — 86780 TREPONEMA PALLIDUM: CPT

## 2023-02-04 LAB — PATH REV: NORMAL

## 2023-02-16 ENCOUNTER — OFFICE VISIT (OUTPATIENT)
Dept: FAMILY MEDICINE | Facility: CLINIC | Age: 23
End: 2023-02-16
Payer: MEDICAID

## 2023-02-16 ENCOUNTER — PATIENT MESSAGE (OUTPATIENT)
Dept: FAMILY MEDICINE | Facility: CLINIC | Age: 23
End: 2023-02-16

## 2023-02-16 DIAGNOSIS — G47.00 INSOMNIA, UNSPECIFIED TYPE: Primary | ICD-10-CM

## 2023-02-16 DIAGNOSIS — F51.01 PRIMARY INSOMNIA: ICD-10-CM

## 2023-02-16 DIAGNOSIS — R11.0 CHRONIC NAUSEA: ICD-10-CM

## 2023-02-16 DIAGNOSIS — F41.9 ANXIETY: ICD-10-CM

## 2023-02-16 DIAGNOSIS — R30.0 DYSURIA: ICD-10-CM

## 2023-02-16 PROCEDURE — 99214 PR OFFICE/OUTPT VISIT, EST, LEVL IV, 30-39 MIN: ICD-10-PCS | Mod: 95,,, | Performed by: NURSE PRACTITIONER

## 2023-02-16 PROCEDURE — 99214 OFFICE O/P EST MOD 30 MIN: CPT | Mod: 95,,, | Performed by: NURSE PRACTITIONER

## 2023-02-16 PROCEDURE — 1160F RVW MEDS BY RX/DR IN RCRD: CPT | Mod: CPTII,95,, | Performed by: NURSE PRACTITIONER

## 2023-02-16 PROCEDURE — 1160F PR REVIEW ALL MEDS BY PRESCRIBER/CLIN PHARMACIST DOCUMENTED: ICD-10-PCS | Mod: CPTII,95,, | Performed by: NURSE PRACTITIONER

## 2023-02-16 PROCEDURE — 1159F MED LIST DOCD IN RCRD: CPT | Mod: CPTII,95,, | Performed by: NURSE PRACTITIONER

## 2023-02-16 PROCEDURE — 1159F PR MEDICATION LIST DOCUMENTED IN MEDICAL RECORD: ICD-10-PCS | Mod: CPTII,95,, | Performed by: NURSE PRACTITIONER

## 2023-02-16 RX ORDER — ESCITALOPRAM OXALATE 5 MG/1
5 TABLET ORAL DAILY
Qty: 30 TABLET | Refills: 6 | Status: SHIPPED | OUTPATIENT
Start: 2023-02-16 | End: 2023-06-12

## 2023-02-16 RX ORDER — OMEPRAZOLE 40 MG/1
40 CAPSULE, DELAYED RELEASE ORAL EVERY MORNING
Qty: 30 CAPSULE | Refills: 6 | Status: SHIPPED | OUTPATIENT
Start: 2023-02-16

## 2023-02-16 RX ORDER — BUSPIRONE HYDROCHLORIDE 30 MG/1
30 TABLET ORAL 2 TIMES DAILY
Qty: 60 TABLET | Refills: 6 | Status: SHIPPED | OUTPATIENT
Start: 2023-02-16 | End: 2024-01-23 | Stop reason: SDUPTHER

## 2023-02-16 RX ORDER — TRAZODONE HYDROCHLORIDE 50 MG/1
50 TABLET ORAL NIGHTLY PRN
Qty: 30 TABLET | Refills: 3 | Status: SHIPPED | OUTPATIENT
Start: 2023-02-16 | End: 2023-03-01

## 2023-02-16 RX ORDER — ONDANSETRON 8 MG/1
8 TABLET, ORALLY DISINTEGRATING ORAL EVERY 6 HOURS PRN
Qty: 30 TABLET | Refills: 6 | Status: SHIPPED | OUTPATIENT
Start: 2023-02-16

## 2023-02-16 NOTE — PROGRESS NOTES
Patient Name: Sada Cruz   : 2000  MRN: [id number]     SUBJECTIVE DATA:    CHIEF COMPLAINT:   Sada Cruz is a 22 y.o. female who presents to clinic today with Follow-up (6 month f/u, trouble falling and staying asleep, refills)        HPI:  23:  Follow up 6 months, insomnia (trouble falling and staying alseep), needs refills.  Is able to fall asleep eventually but is up at 2 or 3 am.  Last 3 months has had this issue.  Is taking anxiety meds as directed.  Has tried Melatonin 3 years ago but was taking every n ght and body got used to it so it does not do anything for her anymore.  Denies sleep apnea symptoms discussed via call today.  Anxiety is well-controlled on Buspirone and Lexapro. Still with occasional nausea after having GB removed.  Is improved but not resolved.  No FU with GI noted.        Audio Only Telehealth Visit  The patient location is: [place]  Visit type: Virtual visit with audio only (telephone)    Total time spent with patient: 20 min including time spent talking to the patient about her insomnia, medications and ascertaining efficacy, reviewing old record, preparing chart for call, med reconciliation, and follow up care.   The reason for the audio only service rather than synchronous audio and video virtual visit was related to technical difficulties or patient preference/necessity.  Each patient to whom I provide medical services by telemedicine is:  (1) informed of the relationship between the physician and patient and the respective role of any other health care provider with respect to management of the patient; and (2) notified that they may decline to receive medical services by telemedicine and may withdraw from such care at any time. Patient verbally consented to receive this service via voice-only telephone call.   This service was not originating from a related E/M service provided within the previous 7 days nor will  to an E/M service or procedure within the next 24  hours or my soonest available appointment.  Prevailing standard of care was able to be met in this audio-only visit.           8/18/22: FU visit for assessment of increase in Buspirone and Lexapro.    Lexapro working well and Buspirone increase has helped her.  She has no c/o depression or anxiety today. Is smiling during visit.  States she feels a lot better since her gall bladder was removed recently.  Does have FU scheduled with surgeon at [facility].   States one of her incisions got infected and was not healing correctly.  She is having to pack wound currently. Voices no other complaints today. Most recent labs done in June reviewed and are unremarkable.   Has been to ortho for her ankle.  Issue seems resolved.    Needs refill on her Lexapro and her Buspirone.     5/20/22:  FU 1 month Anxiety  Patient was started on Lexapro at last visit 1 month ago.  She presents today for follow-up and assessment of Lexapro with buspirone.  Patient states Lexapro is working.  Today she also complains of a left ankle sprain.  She initially sprained her ankle in January.  Continued pain to left lateral foot right above the left lateral malleolus.  Denies swelling.  x-ray performed in January at [facility].  No acute abnormality of left ankle demonstrated on x-ray.  Labs in March show FLP with Chol 201, HDL 51, Trig 76, . All other labs unremarkable.     4/21/22: FU  Anxiety: DIMPLE 5 today. Thinks needs increase of Buspirone.  Had period last two days of January then went 41 days without one. pregnancy test today is negative.  Nausea: still has issues with nausea.     1/21/22: 3 month FU  Light period in December but not spotting or full blown, no birth control since June.  Unable to get her Buspirone 15mg TID due to pharmacy issues.   GERD, omeprazole refilled             ALLERGIES: Review of patient's allergies indicates:  No Known Allergies      ROS:  Review of Systems   HENT:  Negative for hearing loss.    Eyes:   Negative for discharge.   Respiratory:  Negative for wheezing.    Cardiovascular:  Negative for chest pain and palpitations.   Gastrointestinal:  Negative for blood in stool, constipation, diarrhea and vomiting.   Genitourinary:  Negative for dysuria and hematuria.   Musculoskeletal:  Negative for neck pain.   Neurological:  Negative for weakness and headaches.   Endo/Heme/Allergies:  Negative for polydipsia.   Psychiatric/Behavioral:  The patient has insomnia.        OBJECTIVE DATA:  Vital signs  There were no vitals filed for this visit.   There is no height or weight on file to calculate BMI.    PHYSICAL EXAM:   Physical Exam  Vitals and nursing note reviewed.   Neurological:      Mental Status: She is alert.   Psychiatric:         Attention and Perception: Attention normal.         Mood and Affect: Mood normal.         Speech: Speech normal.         Behavior: Behavior normal. Behavior is cooperative.         Thought Content: Thought content normal.         Cognition and Memory: Cognition normal.         Judgment: Judgment normal.        ASSESSMENT/PLAN:  1. Insomnia, unspecified type  -     traZODone (DESYREL) 50 MG tablet; Take 1 tablet (50 mg total) by mouth nightly as needed for Insomnia.  Dispense: 30 tablet; Refill: 3    2. Dysuria  -     Urinalysis, Reflex to Urine Culture; Future; Expected date: 02/16/2023    3. Anxiety  Assessment & Plan:  Lexapro and Buspirone refilled today  Chronic issue, controlled  Practice deep breathing or abdominal breathing exercises when anxiety occurs.  Exercise daily. Get sunlight daily.  Avoid caffeine, alcohol and stimulants.  Practice positive phrases and repeat throughout the day, yoga, lavender scents or Chamomile tea will help anxiety.  Set healthy boundaries, avoid people and conversations that increase stress.  Reports any symptoms of suicidal or homicidal ideations immediately, if clinic is closed go to nearest emergency room.        Orders:  -     busPIRone (BUSPAR)  30 MG Tab; Take 1 tablet (30 mg total) by mouth 2 (two) times daily.  Dispense: 60 tablet; Refill: 6  -     EScitalopram oxalate (LEXAPRO) 5 MG Tab; Take 1 tablet (5 mg total) by mouth once daily. For 30 days  Dispense: 30 tablet; Refill: 6    4. Chronic nausea  Assessment & Plan:  Improved since removing gallbladder.  Has not FU with GI yet.  Asking for refill of Zofran.    Zofran 8mg SL 1 po q6 hour prn nausea to pharmacy.     Orders:  -     ondansetron (ZOFRAN-ODT) 8 MG TbDL; Take 1 tablet (8 mg total) by mouth every 6 (six) hours as needed (nausea).  Dispense: 30 tablet; Refill: 6  -     omeprazole (PRILOSEC) 40 MG capsule; Take 1 capsule (40 mg total) by mouth every morning.  Dispense: 30 capsule; Refill: 6    5. Primary insomnia  Assessment & Plan:  Trial Trazodone 50mg po nightly for insomnia. 2 week virtual visit to discuss efficacy of medications.   Avoid caffeine, alcohol and stimulants. Do not use illicit drugs.  Practice positive phrases and repeat throughout the day.  Try yoga, lavender scents or Chamomile tea to promote relaxation.  Set healthy boundaries, avoid people and conversations that increase stress.  Avoid caffeinated beverages after lunch  Avoid alcohol near bedtime (eg, late afternoon and evening)  Avoid smoking or other nicotine intake, particularly during the evening  Exercise regularly for at least 20 minutes, preferably more than four to five hours prior to bedtime  Avoid daytime naps, especially if they are longer than 20 to 30 minutes or occur late in the day  Resolve concerns or worries before bedtime  Try not to force sleep    Sleep Hygiene Techniques: Sleep hygiene refers to actions that tend to improve and maintain good sleep  Power down electronic devices at least one hour prior to bedtime.  Keep room dark; use eye mask or relaxation sound machine to promote rest.  Sleep as long as necessary to feel rested (usually seven to eight hours for adults) and then get out of bed  Maintain  a regular sleep schedule, particularly a regular wake-up time in the morning               RESULTS:  Recent Results (from the past 1008 hour(s))   SYPHILIS ANTIBODY (WITH REFLEX RPR)    Collection Time: 02/02/23 11:17 AM   Result Value Ref Range    Syphilis Antibody Nonreactive Nonreactive, Equivocal   HIV 1/2 Ag/Ab (4th Gen)    Collection Time: 02/02/23 11:17 AM   Result Value Ref Range    HIV Nonreactive Nonreactive   Hepatitis B Core Antibody, Total    Collection Time: 02/02/23 11:17 AM   Result Value Ref Range    Hepatitis B Core Antibody Nonreactive Nonreactive   Hepatitis Panel, Acute    Collection Time: 02/02/23 11:17 AM   Result Value Ref Range    Hepatitis A IgM Nonreactive Nonreactive    Hepatitis B Core IgM Nonreactive Nonreactive    Hepatitis B Surface Antigen Nonreactive Nonreactive    Hepatitis C Antibody Nonreactive Nonreactive   Hemoglobin A1C    Collection Time: 02/02/23 11:17 AM   Result Value Ref Range    Hemoglobin A1c 5.1 <=7.0 %    Estimated Average Glucose 99.7 mg/dL   Urinalysis    Collection Time: 02/02/23 11:17 AM   Result Value Ref Range    Color, UA Orange (A) Yellow, Light-Yellow, Dark Yellow, Kala, Straw    Appearance, UA Turbid (A) Clear    Specific Gravity, UA 1.025     pH, UA 5.5 5.0 - 8.5    Protein, UA Negative Negative mg/dL    Glucose, UA Normal Negative, Normal mg/dL    Ketones, UA Negative Negative mg/dL    Blood, UA Negative Negative unit/L    Bilirubin, UA Negative Negative mg/dL    Urobilinogen, UA Normal 0.2, 1.0, Normal mg/dL    Nitrites, UA Negative Negative    Leukocyte Esterase,  (A) Negative unit/L    WBC, UA 6-10 (A) None Seen, 0-2, 3-5, 0-5 /HPF    WBC Clumps, UA Few (A) None Seen, 0-5 /HPF    Bacteria, UA Few (A) None Seen /HPF    Budding Yeast, UA Moderate (A) None Seen /HPF    Squamous Epithelial Cells, UA Moderate (A) None Seen /HPF    Mucous, UA Few (A) None Seen /LPF    Sperm, UA Trace (A) None Seen /HPF    Hyaline Casts, UA None Seen None Seen /lpf     Unclassified Crystal, UA Few (A) None Seen /HPF    RBC, UA 0-5 None Seen, 0-2, 3-5, 0-5 /HPF   Lipid Panel    Collection Time: 02/02/23 11:17 AM   Result Value Ref Range    Cholesterol Total 204 (H) <=200 mg/dL    HDL Cholesterol 51 35 - 60 mg/dL    Triglyceride 59 37 - 140 mg/dL    Cholesterol/HDL Ratio 4 0 - 5    Very Low Density Lipoprotein 12     LDL Cholesterol 141.00 (H) 50.00 - 140.00 mg/dL   T4, Free    Collection Time: 02/02/23 11:17 AM   Result Value Ref Range    Thyroxine Free 0.86 0.70 - 1.48 ng/dL   TSH    Collection Time: 02/02/23 11:17 AM   Result Value Ref Range    Thyroid Stimulating Hormone 2.024 0.350 - 4.940 uIU/mL   Comprehensive Metabolic Panel    Collection Time: 02/02/23 11:17 AM   Result Value Ref Range    Sodium Level 137 136 - 145 mmol/L    Potassium Level 4.8 3.5 - 5.1 mmol/L    Chloride 106 98 - 107 mmol/L    Carbon Dioxide 23 22 - 29 mmol/L    Glucose Level 78 74 - 100 mg/dL    Blood Urea Nitrogen 11.4 7.0 - 18.7 mg/dL    Creatinine 0.84 0.55 - 1.02 mg/dL    Calcium Level Total 9.9 8.4 - 10.2 mg/dL    Protein Total 7.9 6.4 - 8.3 gm/dL    Albumin Level 4.6 3.5 - 5.0 g/dL    Globulin 3.3 2.4 - 3.5 gm/dL    Albumin/Globulin Ratio 1.4 1.1 - 2.0 ratio    Bilirubin Total 0.7 <=1.5 mg/dL    Alkaline Phosphatase 70 40 - 150 unit/L    Alanine Aminotransferase 17 0 - 55 unit/L    Aspartate Aminotransferase 17 5 - 34 unit/L    eGFR >60 mls/min/1.73/m2   CBC with Differential    Collection Time: 02/02/23 11:17 AM   Result Value Ref Range    WBC 5.9 4.5 - 11.5 x10(3)/mcL    RBC 4.78 4.20 - 5.40 x10(6)/mcL    Hgb 13.8 12.0 - 16.0 gm/dL    Hct 41.3 37.0 - 47.0 %    MCV 86.4 80.0 - 94.0 fL    MCH 28.9 pg    MCHC 33.4 33.0 - 36.0 mg/dL    RDW 13.2 11.5 - 17.0 %    Platelet 321 130 - 400 x10(3)/mcL    MPV 9.9 7.4 - 10.4 fL    Neut % 58.1 %    Lymph % 33.3 %    Mono % 6.8 %    Eos % 0.9 %    Basophil % 0.7 %    Lymph # 1.95 0.6 - 4.6 x10(3)/mcL    Neut # 3.40 2.1 - 9.2 x10(3)/mcL    Mono # 0.40 0.1 - 1.3  x10(3)/mcL    Eos # 0.05 0 - 0.9 x10(3)/mcL    Baso # 0.04 0 - 0.2 x10(3)/mcL    IG# 0.01 0 - 0.04 x10(3)/mcL    IG% 0.2 %    NRBC% 0.0 %   Pathologist Interpretation    Collection Time: 02/02/23 11:17 AM   Result Value Ref Range    Pathology Review       No serological evidence of recent or past hepatitis-A, B or C infection.    Dav Torres MD         Follow Up:  Follow up in about 2 weeks (around 3/2/2023) for Virtual Visit, Med Assessment.             This note was created with the assistance of a voice recognition software or phone dictation. There may be transcription errors as a result of using this technology however minimal. Effort has been made to assure accuracy of transcription but any obvious errors or omissions should be clarified with the author of the document

## 2023-02-16 NOTE — ASSESSMENT & PLAN NOTE
Lexapro and Buspirone refilled today  Chronic issue, controlled  Practice deep breathing or abdominal breathing exercises when anxiety occurs.  Exercise daily. Get sunlight daily.  Avoid caffeine, alcohol and stimulants.  Practice positive phrases and repeat throughout the day, yoga, lavender scents or Chamomile tea will help anxiety.  Set healthy boundaries, avoid people and conversations that increase stress.  Reports any symptoms of suicidal or homicidal ideations immediately, if clinic is closed go to nearest emergency room.

## 2023-02-16 NOTE — ASSESSMENT & PLAN NOTE
Improved since removing gallbladder.  Has not FU with GI yet.  Asking for refill of Zofran.    Zofran 8mg SL 1 po q6 hour prn nausea to pharmacy.

## 2023-02-16 NOTE — ASSESSMENT & PLAN NOTE
Trial Trazodone 50mg po nightly for insomnia. 2 week virtual visit to discuss efficacy of medications.   Avoid caffeine, alcohol and stimulants. Do not use illicit drugs.  Practice positive phrases and repeat throughout the day.  Try yoga, lavender scents or Chamomile tea to promote relaxation.  Set healthy boundaries, avoid people and conversations that increase stress.  Avoid caffeinated beverages after lunch  Avoid alcohol near bedtime (eg, late afternoon and evening)  Avoid smoking or other nicotine intake, particularly during the evening  Exercise regularly for at least 20 minutes, preferably more than four to five hours prior to bedtime  Avoid daytime naps, especially if they are longer than 20 to 30 minutes or occur late in the day  Resolve concerns or worries before bedtime  Try not to force sleep    Sleep Hygiene Techniques: Sleep hygiene refers to actions that tend to improve and maintain good sleep  Power down electronic devices at least one hour prior to bedtime.  Keep room dark; use eye mask or relaxation sound machine to promote rest.  Sleep as long as necessary to feel rested (usually seven to eight hours for adults) and then get out of bed  Maintain a regular sleep schedule, particularly a regular wake-up time in the morning

## 2023-02-28 ENCOUNTER — OFFICE VISIT (OUTPATIENT)
Dept: FAMILY MEDICINE | Facility: CLINIC | Age: 23
End: 2023-02-28
Payer: MEDICAID

## 2023-02-28 DIAGNOSIS — F51.01 PRIMARY INSOMNIA: Primary | ICD-10-CM

## 2023-02-28 PROCEDURE — 1160F RVW MEDS BY RX/DR IN RCRD: CPT | Mod: CPTII,95,, | Performed by: NURSE PRACTITIONER

## 2023-02-28 PROCEDURE — 99213 OFFICE O/P EST LOW 20 MIN: CPT | Mod: 95,,, | Performed by: NURSE PRACTITIONER

## 2023-02-28 PROCEDURE — 1160F PR REVIEW ALL MEDS BY PRESCRIBER/CLIN PHARMACIST DOCUMENTED: ICD-10-PCS | Mod: CPTII,95,, | Performed by: NURSE PRACTITIONER

## 2023-02-28 PROCEDURE — 1159F PR MEDICATION LIST DOCUMENTED IN MEDICAL RECORD: ICD-10-PCS | Mod: CPTII,95,, | Performed by: NURSE PRACTITIONER

## 2023-02-28 PROCEDURE — 1159F MED LIST DOCD IN RCRD: CPT | Mod: CPTII,95,, | Performed by: NURSE PRACTITIONER

## 2023-02-28 PROCEDURE — 99213 PR OFFICE/OUTPT VISIT, EST, LEVL III, 20-29 MIN: ICD-10-PCS | Mod: 95,,, | Performed by: NURSE PRACTITIONER

## 2023-02-28 NOTE — ASSESSMENT & PLAN NOTE
Inc to 100mg nightly, let me know if this works better for you  Avoid caffeine, alcohol and stimulants. Do not use illicit drugs.  Practice positive phrases and repeat throughout the day.  Try yoga, lavender scents or Chamomile tea to promote relaxation.  Set healthy boundaries, avoid people and conversations that increase stress.  Avoid caffeinated beverages after lunch  Avoid alcohol near bedtime (eg, late afternoon and evening)  Avoid smoking or other nicotine intake, particularly during the evening  Exercise regularly for at least 20 minutes, preferably more than four to five hours prior to bedtime  Avoid daytime naps, especially if they are longer than 20 to 30 minutes or occur late in the day  Resolve concerns or worries before bedtime  Try not to force sleep    Sleep Hygiene Techniques: Sleep hygiene refers to actions that tend to improve and maintain good sleep  Power down electronic devices at least one hour prior to bedtime.  Keep room dark; use eye mask or relaxation sound machine to promote rest.  Sleep as long as necessary to feel rested (usually seven to eight hours for adults) and then get out of bed  Maintain a regular sleep schedule, particularly a regular wake-up time in the morning

## 2023-02-28 NOTE — PROGRESS NOTES
Patient Name: Sada Cruz   : 2000  MRN: [id number]     SUBJECTIVE DATA:    CHIEF COMPLAINT:   Sada Cruz is a 22 y.o. female who presents to clinic today with Follow-up (2 week f/u med assessment)        HPI:  23 : 2 week FU med assessment, treatment for insomnia last visit.  Also ordered UA which was not done. She is having trouble falling asleep but is staying asleep once she does fall asleep.  Tried 1/2 dose first night but it did not work with 1/2. So she took a whole and it worked better. Has not tried 100mg yet but will tonight.      Audio Only Telehealth Visit  The patient location is: [place]  Visit type: Virtual visit with audio only (telephone)    Total time spent with patient: 15 min including time spent talking to the patient about her medication and ascertaining efficacy, reviewing old record, preparing chart for call, med reconciliation, and follow up care.   The reason for the audio only service rather than synchronous audio and video virtual visit was related to technical difficulties or patient preference/necessity.  Each patient to whom I provide medical services by telemedicine is:  (1) informed of the relationship between the physician and patient and the respective role of any other health care provider with respect to management of the patient; and (2) notified that they may decline to receive medical services by telemedicine and may withdraw from such care at any time. Patient verbally consented to receive this service via voice-only telephone call.   This service was not originating from a related E/M service provided within the previous 7 days nor will  to an E/M service or procedure within the next 24 hours or my soonest available appointment.  Prevailing standard of care was able to be met in this audio-only visit.         23:  Follow up 6 months, insomnia (trouble falling and staying alseep), needs refills.  Is able to fall asleep eventually but is up at 2 or 3 am.   Last 3 months has had this issue.  Is taking anxiety meds as directed.  Has tried Melatonin 3 years ago but was taking every n ght and body got used to it so it does not do anything for her anymore.  Denies sleep apnea symptoms discussed via call today.  Anxiety is well-controlled on Buspirone and Lexapro. Still with occasional nausea after having GB removed.  Is improved but not resolved.  No FU with GI noted.        Audio Only Telehealth Visit  The patient location is: [place]  Visit type: Virtual visit with audio only (telephone)    Total time spent with patient: 20 min including time spent talking to the patient about her insomnia, medications and ascertaining efficacy, reviewing old record, preparing chart for call, med reconciliation, and follow up care.   The reason for the audio only service rather than synchronous audio and video virtual visit was related to technical difficulties or patient preference/necessity.  Each patient to whom I provide medical services by telemedicine is:  (1) informed of the relationship between the physician and patient and the respective role of any other health care provider with respect to management of the patient; and (2) notified that they may decline to receive medical services by telemedicine and may withdraw from such care at any time. Patient verbally consented to receive this service via voice-only telephone call.   This service was not originating from a related E/M service provided within the previous 7 days nor will  to an E/M service or procedure within the next 24 hours or my soonest available appointment.  Prevailing standard of care was able to be met in this audio-only visit.           8/18/22: FU visit for assessment of increase in Buspirone and Lexapro.    Lexapro working well and Buspirone increase has helped her.  She has no c/o depression or anxiety today. Is smiling during visit.  States she feels a lot better since her gall bladder was removed  recently.  Does have FU scheduled with surgeon at [facility].   States one of her incisions got infected and was not healing correctly.  She is having to pack wound currently. Voices no other complaints today. Most recent labs done in June reviewed and are unremarkable.   Has been to ortho for her ankle.  Issue seems resolved.    Needs refill on her Lexapro and her Buspirone.     5/20/22:  FU 1 month Anxiety  Patient was started on Lexapro at last visit 1 month ago.  She presents today for follow-up and assessment of Lexapro with buspirone.  Patient states Lexapro is working.  Today she also complains of a left ankle sprain.  She initially sprained her ankle in January.  Continued pain to left lateral foot right above the left lateral malleolus.  Denies swelling.  x-ray performed in January at [facility].  No acute abnormality of left ankle demonstrated on x-ray.  Labs in March show FLP with Chol 201, HDL 51, Trig 76, . All other labs unremarkable.     4/21/22: FU  Anxiety: DIMPLE 5 today. Thinks needs increase of Buspirone.  Had period last two days of January then went 41 days without one. pregnancy test today is negative.  Nausea: still has issues with nausea.     1/21/22: 3 month FU  Light period in December but not spotting or full blown, no birth control since June.  Unable to get her Buspirone 15mg TID due to pharmacy issues.   GERD, omeprazole refilled           ALLERGIES: Review of patient's allergies indicates:  No Known Allergies      ROS:  ROS      OBJECTIVE DATA:  Vital signs  There were no vitals filed for this visit.   There is no height or weight on file to calculate BMI.    PHYSICAL EXAM:   Physical Exam     ASSESSMENT/PLAN:  1. Primary insomnia  Assessment & Plan:  Inc to 100mg nightly, let me know if this works better for you  Avoid caffeine, alcohol and stimulants. Do not use illicit drugs.  Practice positive phrases and repeat throughout the day.  Try yoga, lavender scents or Chamomile tea  to promote relaxation.  Set healthy boundaries, avoid people and conversations that increase stress.  Avoid caffeinated beverages after lunch  Avoid alcohol near bedtime (eg, late afternoon and evening)  Avoid smoking or other nicotine intake, particularly during the evening  Exercise regularly for at least 20 minutes, preferably more than four to five hours prior to bedtime  Avoid daytime naps, especially if they are longer than 20 to 30 minutes or occur late in the day  Resolve concerns or worries before bedtime  Try not to force sleep    Sleep Hygiene Techniques: Sleep hygiene refers to actions that tend to improve and maintain good sleep  Power down electronic devices at least one hour prior to bedtime.  Keep room dark; use eye mask or relaxation sound machine to promote rest.  Sleep as long as necessary to feel rested (usually seven to eight hours for adults) and then get out of bed  Maintain a regular sleep schedule, particularly a regular wake-up time in the morning               RESULTS:  Recent Results (from the past 1008 hour(s))   SYPHILIS ANTIBODY (WITH REFLEX RPR)    Collection Time: 02/02/23 11:17 AM   Result Value Ref Range    Syphilis Antibody Nonreactive Nonreactive, Equivocal   HIV 1/2 Ag/Ab (4th Gen)    Collection Time: 02/02/23 11:17 AM   Result Value Ref Range    HIV Nonreactive Nonreactive   Hepatitis B Core Antibody, Total    Collection Time: 02/02/23 11:17 AM   Result Value Ref Range    Hepatitis B Core Antibody Nonreactive Nonreactive   Hepatitis Panel, Acute    Collection Time: 02/02/23 11:17 AM   Result Value Ref Range    Hepatitis A IgM Nonreactive Nonreactive    Hepatitis B Core IgM Nonreactive Nonreactive    Hepatitis B Surface Antigen Nonreactive Nonreactive    Hepatitis C Antibody Nonreactive Nonreactive   Hemoglobin A1C    Collection Time: 02/02/23 11:17 AM   Result Value Ref Range    Hemoglobin A1c 5.1 <=7.0 %    Estimated Average Glucose 99.7 mg/dL   Urinalysis    Collection Time:  02/02/23 11:17 AM   Result Value Ref Range    Color, UA Orange (A) Yellow, Light-Yellow, Dark Yellow, Kala, Straw    Appearance, UA Turbid (A) Clear    Specific Gravity, UA 1.025     pH, UA 5.5 5.0 - 8.5    Protein, UA Negative Negative mg/dL    Glucose, UA Normal Negative, Normal mg/dL    Ketones, UA Negative Negative mg/dL    Blood, UA Negative Negative unit/L    Bilirubin, UA Negative Negative mg/dL    Urobilinogen, UA Normal 0.2, 1.0, Normal mg/dL    Nitrites, UA Negative Negative    Leukocyte Esterase,  (A) Negative unit/L    WBC, UA 6-10 (A) None Seen, 0-2, 3-5, 0-5 /HPF    WBC Clumps, UA Few (A) None Seen, 0-5 /HPF    Bacteria, UA Few (A) None Seen /HPF    Budding Yeast, UA Moderate (A) None Seen /HPF    Squamous Epithelial Cells, UA Moderate (A) None Seen /HPF    Mucous, UA Few (A) None Seen /LPF    Sperm, UA Trace (A) None Seen /HPF    Hyaline Casts, UA None Seen None Seen /lpf    Unclassified Crystal, UA Few (A) None Seen /HPF    RBC, UA 0-5 None Seen, 0-2, 3-5, 0-5 /HPF   Lipid Panel    Collection Time: 02/02/23 11:17 AM   Result Value Ref Range    Cholesterol Total 204 (H) <=200 mg/dL    HDL Cholesterol 51 35 - 60 mg/dL    Triglyceride 59 37 - 140 mg/dL    Cholesterol/HDL Ratio 4 0 - 5    Very Low Density Lipoprotein 12     LDL Cholesterol 141.00 (H) 50.00 - 140.00 mg/dL   T4, Free    Collection Time: 02/02/23 11:17 AM   Result Value Ref Range    Thyroxine Free 0.86 0.70 - 1.48 ng/dL   TSH    Collection Time: 02/02/23 11:17 AM   Result Value Ref Range    Thyroid Stimulating Hormone 2.024 0.350 - 4.940 uIU/mL   Comprehensive Metabolic Panel    Collection Time: 02/02/23 11:17 AM   Result Value Ref Range    Sodium Level 137 136 - 145 mmol/L    Potassium Level 4.8 3.5 - 5.1 mmol/L    Chloride 106 98 - 107 mmol/L    Carbon Dioxide 23 22 - 29 mmol/L    Glucose Level 78 74 - 100 mg/dL    Blood Urea Nitrogen 11.4 7.0 - 18.7 mg/dL    Creatinine 0.84 0.55 - 1.02 mg/dL    Calcium Level Total 9.9 8.4 - 10.2  mg/dL    Protein Total 7.9 6.4 - 8.3 gm/dL    Albumin Level 4.6 3.5 - 5.0 g/dL    Globulin 3.3 2.4 - 3.5 gm/dL    Albumin/Globulin Ratio 1.4 1.1 - 2.0 ratio    Bilirubin Total 0.7 <=1.5 mg/dL    Alkaline Phosphatase 70 40 - 150 unit/L    Alanine Aminotransferase 17 0 - 55 unit/L    Aspartate Aminotransferase 17 5 - 34 unit/L    eGFR >60 mls/min/1.73/m2   CBC with Differential    Collection Time: 02/02/23 11:17 AM   Result Value Ref Range    WBC 5.9 4.5 - 11.5 x10(3)/mcL    RBC 4.78 4.20 - 5.40 x10(6)/mcL    Hgb 13.8 12.0 - 16.0 gm/dL    Hct 41.3 37.0 - 47.0 %    MCV 86.4 80.0 - 94.0 fL    MCH 28.9 pg    MCHC 33.4 33.0 - 36.0 mg/dL    RDW 13.2 11.5 - 17.0 %    Platelet 321 130 - 400 x10(3)/mcL    MPV 9.9 7.4 - 10.4 fL    Neut % 58.1 %    Lymph % 33.3 %    Mono % 6.8 %    Eos % 0.9 %    Basophil % 0.7 %    Lymph # 1.95 0.6 - 4.6 x10(3)/mcL    Neut # 3.40 2.1 - 9.2 x10(3)/mcL    Mono # 0.40 0.1 - 1.3 x10(3)/mcL    Eos # 0.05 0 - 0.9 x10(3)/mcL    Baso # 0.04 0 - 0.2 x10(3)/mcL    IG# 0.01 0 - 0.04 x10(3)/mcL    IG% 0.2 %    NRBC% 0.0 %   Pathologist Interpretation    Collection Time: 02/02/23 11:17 AM   Result Value Ref Range    Pathology Review       No serological evidence of recent or past hepatitis-A, B or C infection.    Dav Torres MD         Follow Up:  Follow up in about 3 months (around 5/28/2023) for Insomnia.           This note was created with the assistance of a voice recognition software or phone dictation. There may be transcription errors as a result of using this technology however minimal. Effort has been made to assure accuracy of transcription but any obvious errors or omissions should be clarified with the author of the document

## 2023-03-01 ENCOUNTER — TELEPHONE (OUTPATIENT)
Dept: FAMILY MEDICINE | Facility: CLINIC | Age: 23
End: 2023-03-01
Payer: MEDICAID

## 2023-03-01 DIAGNOSIS — G47.00 INSOMNIA, UNSPECIFIED TYPE: ICD-10-CM

## 2023-03-01 RX ORDER — TRAZODONE HYDROCHLORIDE 100 MG/1
100 TABLET ORAL NIGHTLY
Qty: 30 TABLET | Refills: 6 | Status: SHIPPED | OUTPATIENT
Start: 2023-03-01 | End: 2023-06-12

## 2023-03-01 NOTE — PROGRESS NOTES
I have sent medications and/or lab orders in for this patient.  Please notify the patient.     No orders of the defined types were placed in this encounter.      Medications Ordered This Encounter   Medications    traZODone (DESYREL) 100 MG tablet     Sig: Take 1 tablet (100 mg total) by mouth every evening.     Dispense:  30 tablet     Refill:  6

## 2023-03-14 ENCOUNTER — TELEPHONE (OUTPATIENT)
Dept: FAMILY MEDICINE | Facility: CLINIC | Age: 23
End: 2023-03-14
Payer: MEDICAID

## 2023-03-14 NOTE — TELEPHONE ENCOUNTER
Called patient, no answer, left VM for patient to return the call. Need which medication and which pharmacy in [place]

## 2023-06-12 ENCOUNTER — OFFICE VISIT (OUTPATIENT)
Dept: FAMILY MEDICINE | Facility: CLINIC | Age: 23
End: 2023-06-12
Payer: MEDICAID

## 2023-06-12 VITALS
WEIGHT: 223 LBS | DIASTOLIC BLOOD PRESSURE: 68 MMHG | HEART RATE: 74 BPM | OXYGEN SATURATION: 98 % | BODY MASS INDEX: 35 KG/M2 | SYSTOLIC BLOOD PRESSURE: 116 MMHG | TEMPERATURE: 98 F | HEIGHT: 67 IN

## 2023-06-12 DIAGNOSIS — F51.01 PRIMARY INSOMNIA: Primary | ICD-10-CM

## 2023-06-12 DIAGNOSIS — F41.9 ANXIETY: ICD-10-CM

## 2023-06-12 DIAGNOSIS — H61.22 IMPACTED CERUMEN OF LEFT EAR: ICD-10-CM

## 2023-06-12 PROCEDURE — 3074F SYST BP LT 130 MM HG: CPT | Mod: CPTII,,, | Performed by: NURSE PRACTITIONER

## 2023-06-12 PROCEDURE — 99214 OFFICE O/P EST MOD 30 MIN: CPT | Mod: PBBFAC,PN | Performed by: NURSE PRACTITIONER

## 2023-06-12 PROCEDURE — 3078F PR MOST RECENT DIASTOLIC BLOOD PRESSURE < 80 MM HG: ICD-10-PCS | Mod: CPTII,,, | Performed by: NURSE PRACTITIONER

## 2023-06-12 PROCEDURE — 99214 OFFICE O/P EST MOD 30 MIN: CPT | Mod: S$PBB,,, | Performed by: NURSE PRACTITIONER

## 2023-06-12 PROCEDURE — 3008F BODY MASS INDEX DOCD: CPT | Mod: CPTII,,, | Performed by: NURSE PRACTITIONER

## 2023-06-12 PROCEDURE — 3078F DIAST BP <80 MM HG: CPT | Mod: CPTII,,, | Performed by: NURSE PRACTITIONER

## 2023-06-12 PROCEDURE — 1159F MED LIST DOCD IN RCRD: CPT | Mod: CPTII,,, | Performed by: NURSE PRACTITIONER

## 2023-06-12 PROCEDURE — 3074F PR MOST RECENT SYSTOLIC BLOOD PRESSURE < 130 MM HG: ICD-10-PCS | Mod: CPTII,,, | Performed by: NURSE PRACTITIONER

## 2023-06-12 PROCEDURE — 3008F PR BODY MASS INDEX (BMI) DOCUMENTED: ICD-10-PCS | Mod: CPTII,,, | Performed by: NURSE PRACTITIONER

## 2023-06-12 PROCEDURE — 1160F RVW MEDS BY RX/DR IN RCRD: CPT | Mod: CPTII,,, | Performed by: NURSE PRACTITIONER

## 2023-06-12 PROCEDURE — 1160F PR REVIEW ALL MEDS BY PRESCRIBER/CLIN PHARMACIST DOCUMENTED: ICD-10-PCS | Mod: CPTII,,, | Performed by: NURSE PRACTITIONER

## 2023-06-12 PROCEDURE — 99214 PR OFFICE/OUTPT VISIT, EST, LEVL IV, 30-39 MIN: ICD-10-PCS | Mod: S$PBB,,, | Performed by: NURSE PRACTITIONER

## 2023-06-12 PROCEDURE — 1159F PR MEDICATION LIST DOCUMENTED IN MEDICAL RECORD: ICD-10-PCS | Mod: CPTII,,, | Performed by: NURSE PRACTITIONER

## 2023-06-12 RX ORDER — ESCITALOPRAM OXALATE 10 MG/1
10 TABLET ORAL DAILY
Qty: 30 TABLET | Refills: 11 | Status: SHIPPED | OUTPATIENT
Start: 2023-06-12 | End: 2023-08-29 | Stop reason: DRUGHIGH

## 2023-06-12 RX ORDER — HYDROXYZINE PAMOATE 25 MG/1
25 CAPSULE ORAL NIGHTLY
Qty: 30 CAPSULE | Refills: 6 | Status: SHIPPED | OUTPATIENT
Start: 2023-06-12 | End: 2024-01-23 | Stop reason: ALTCHOICE

## 2023-06-12 NOTE — ASSESSMENT & PLAN NOTE
Increase Lexapro to 10mg po daily  Find a counselor  Referral to Psych NP for better management of anxiety/nightmares/panic attacks.    Virtual visit in 2 weeks to discuss medication increase.  Practice deep breathing or abdominal breathing exercises when anxiety occurs.  Exercise daily. Get sunlight daily.  Avoid caffeine, alcohol and stimulants.  Practice positive phrases and repeat throughout the day, yoga, lavender scents or Chamomile tea will help anxiety.  Set healthy boundaries, avoid people and conversations that increase stress.  Reports any symptoms of suicidal or homicidal ideations immediately, if clinic is closed go to nearest emergency room.

## 2023-06-12 NOTE — ASSESSMENT & PLAN NOTE
Trial Hydroxyzine 25mg nightly  Referral to Psych NP for further management of anxiety/insomnia/nightmares, panic attacks.  2 week virtual FU for assessment of medication.

## 2023-06-12 NOTE — PROGRESS NOTES
lPatient Name: Sada Cruz   : 2000  MRN: [id number]     SUBJECTIVE DATA:    CHIEF COMPLAINT:   Sada Cruz is a 23 y.o. female who presents to clinic today with FU from insomnia and med assessment        HPI:  23:  FU insomnia, trazodone was causing panic attacks and nightmares, she stopped on her own.  Has had 1 nightmare since that time.  Melatonin does not work.  Trazodone has failed.  Left ear with muffled sounds and states it is clogged.  Also, she states she forgets to take Buspirone which is maxed out at 30mg po BID.  She does take Lexapro daily.    Pt has transportation issues getting to and from appointments.  She is here today with grandmother who is her only source of transportation from [facility].  She has been given number to [facility] to arrange transport.  Resource list given to find counselor.     23 : 2 week FU med assessment, treatment for insomnia last visit.  Also ordered UA which was not done. She is having trouble falling asleep but is staying asleep once she does fall asleep.  Tried 1/2 dose first night but it did not work with 1/2. So she took a whole and it worked better. Has not tried 100mg yet but will tonight.      23:  Follow up 6 months, insomnia (trouble falling and staying alseep), needs refills.  Is able to fall asleep eventually but is up at 2 or 3 am.  Last 3 months has had this issue.  Is taking anxiety meds as directed.  Has tried Melatonin 3 years ago but was taking every n ght and body got used to it so it does not do anything for her anymore.  Denies sleep apnea symptoms discussed via call today.  Anxiety is well-controlled on Buspirone and Lexapro. Still with occasional nausea after having GB removed.  Is improved but not resolved.  No FU with GI noted.      22: FU visit for assessment of increase in Buspirone and Lexapro.    Lexapro working well and Buspirone increase has helped her.  She has no c/o depression or anxiety today. Is smiling during visit.   "States she feels a lot better since her gall bladder was removed recently.  Does have FU scheduled with surgeon at [facility].   States one of her incisions got infected and was not healing correctly.  She is having to pack wound currently. Voices no other complaints today. Most recent labs done in June reviewed and are unremarkable.   Has been to ortho for her ankle.  Issue seems resolved.    Needs refill on her Lexapro and her Buspirone.     5/20/22:  FU 1 month Anxiety  Patient was started on Lexapro at last visit 1 month ago.  She presents today for follow-up and assessment of Lexapro with buspirone.  Patient states Lexapro is working.  Today she also complains of a left ankle sprain.  She initially sprained her ankle in January.  Continued pain to left lateral foot right above the left lateral malleolus.  Denies swelling.  x-ray performed in January at [facility].  No acute abnormality of left ankle demonstrated on x-ray.  Labs in March show FLP with Chol 201, HDL 51, Trig 76, . All other labs unremarkable.     4/21/22: FU  Anxiety: DIMPLE 5 today. Thinks needs increase of Buspirone.  Had period last two days of January then went 41 days without one. pregnancy test today is negative.  Nausea: still has issues with nausea.     1/21/22: 3 month FU  Light period in December but not spotting or full blown, no birth control since June.  Unable to get her Buspirone 15mg TID due to pharmacy issues.   GERD, omeprazole refilled     11/29/21: Urgent Care Follow-up  Went to Urgent Care with symptoms of pregnancy and both urine and serum tests negative.  Hx Anxiety, recently seen 10/5 and put on Buspirone 10mg po TID.   Stopped OCP in June, was having regular periods after stopping until last month. Had "spotting" at 10 days late, then spotted for 1.5 days at the end of October. Reports nausea in the morning and at bedtime, denies vomiting. Reports intermittent generalized abdominal aching. Denies diarrhea, " constipation, or vomiting.  Next GYN Appt here 8/22.    10/5/2021: 1 month follow-up of anxiety.  This is a telehealth visit note. Patient was treated using telehealth, real time audio, video, or both according to [facility] protocols. Sophia RODRIGUEZ FNP-C, conducted the visit from location identified below. The patient participated in the visit at a non-[facility] location selected by the patient (or patient's representative), identified below. I am licensed in the [facility]. The patient (or patient's representative) stated that they understood and accepted the privacy and security risks to their information at their location and has consented to telephone visit.  Patient was located at patient's home.  Sophia RODRIGUEZ FNP-C, was located at [address] [address], [place], [place].  Anxiety: Buspirone TID prescribed last visit. less agitation and not freaking out as much. She has no complaints and states she is doing well today.    9/8/2021: This is a telehealth visit note. Patient was treated using telehealth, real time audio, video, or both according to [facility] protocols. Sophia RODRIGUEZ FNP-C, conducted the visit from location identified below. The patient participated in the visit at a non-[facility] location selected by the patient (or patient's representative), identified below. I am licensed in the [facility]. The patient (or patient's representative) stated that they understood and accepted the privacy and security risks to their information at their location and has consented to telephone visit.  Patient was located at patient's home.  Sophia RODRIGUEZ FNP-C, was located at [address].  Today's visit is follow-up from initial visit.  Was trying to conceive at last visit pregnancy test was negative. She also had one done 8/3 which was negative. Labs reviewed from last visit all WNL.  pt states she has been feeling a lot of anxiety lately and states she has  "been "freaking out" a lot. neighbor started an argument with her boyfriend and she had bad chest pain and felt anxious because they were arguing. She has mood swings which are normal but no increased agitation, only c/o stress and feelings of worry. She has been off of birth control x 2 months. Has not been able to conceive.    6/1/2021: Complaints today: Here to establish care.  Believes she is pregnant. LMP 5/21. Stopped BCP 2 weeks ago. Is trying to conceive. has nausea before bed and in AM.  Cancer Screening:  PAP: 2019: normal needs referral to GYN here.  Vaccines:  Tetanus/Tdap: UTD                ALLERGIES: Review of patient's allergies indicates:  No Known Allergies      ROS:  Review of Systems   Psychiatric/Behavioral:  The patient is nervous/anxious and has insomnia.    All other systems reviewed and are negative.      OBJECTIVE DATA:  Vital signs  Vitals:    06/12/23 0720   BP: 116/68   BP Location: Right arm   Patient Position: Sitting   Pulse: 74   Temp: 98 °F (36.7 °C)   TempSrc: Oral   SpO2: 98%   Weight: 101.2 kg (223 lb)   Height: 5' 7" (1.702 m)      Body mass index is 34.93 kg/m².    PHYSICAL EXAM:   Physical Exam  Vitals and nursing note reviewed.   HENT:      Head: Normocephalic and atraumatic.   Cardiovascular:      Rate and Rhythm: Normal rate and regular rhythm.      Pulses: Normal pulses.      Heart sounds: Normal heart sounds.   Pulmonary:      Breath sounds: Normal breath sounds.   Musculoskeletal:         General: Normal range of motion.      Cervical back: Normal range of motion.   Skin:     General: Skin is warm and dry.   Neurological:      General: No focal deficit present.      Mental Status: She is alert and oriented to person, place, and time.   Psychiatric:         Mood and Affect: Mood normal.        ASSESSMENT/PLAN:  1. Primary insomnia  Assessment & Plan:  Trial Hydroxyzine 25mg nightly  Referral to Psych NP for further management of anxiety/insomnia/nightmares, panic " attacks.  2 week virtual FU for assessment of medication.        Orders:  -     hydrOXYzine pamoate (VISTARIL) 25 MG Cap; Take 1 capsule (25 mg total) by mouth every evening.  Dispense: 30 capsule; Refill: 6  -     Ambulatory referral/consult to Behavioral Health; Future; Expected date: 06/19/2023    2. Anxiety  Assessment & Plan:  Increase Lexapro to 10mg po daily  Find a counselor  Referral to Psych NP for better management of anxiety/nightmares/panic attacks.    Virtual visit in 2 weeks to discuss medication increase.  Practice deep breathing or abdominal breathing exercises when anxiety occurs.  Exercise daily. Get sunlight daily.  Avoid caffeine, alcohol and stimulants.  Practice positive phrases and repeat throughout the day, yoga, lavender scents or Chamomile tea will help anxiety.  Set healthy boundaries, avoid people and conversations that increase stress.  Reports any symptoms of suicidal or homicidal ideations immediately, if clinic is closed go to nearest emergency room.        Orders:  -     EScitalopram oxalate (LEXAPRO) 10 MG tablet; Take 1 tablet (10 mg total) by mouth once daily.  Dispense: 30 tablet; Refill: 11  -     Ambulatory referral/consult to Behavioral Health; Future; Expected date: 06/19/2023    3. Impacted cerumen of left ear  Assessment & Plan:  Left ear irrigation unsuccessful at flushing out wax.  Wax is hard and non-mobile.   Recommend Debrox to soften wax and return to clinic for ear irrigation.               RESULTS:  No results found for this or any previous visit (from the past 1008 hour(s)).      Follow Up:  Follow up in about 2 weeks (around 6/26/2023) for Virtual Visit.             This note was created with the assistance of a voice recognition software or phone dictation. There may be transcription errors as a result of using this technology however minimal. Effort has been made to assure accuracy of transcription but any obvious errors or omissions should be clarified with  the author of the document

## 2023-06-12 NOTE — ASSESSMENT & PLAN NOTE
Left ear irrigation unsuccessful at flushing out wax.  Wax is hard and non-mobile.   Recommend Debrox to soften wax and return to clinic for ear irrigation.

## 2023-06-27 ENCOUNTER — OFFICE VISIT (OUTPATIENT)
Dept: FAMILY MEDICINE | Facility: CLINIC | Age: 23
End: 2023-06-27
Payer: MEDICAID

## 2023-06-27 DIAGNOSIS — F51.01 PRIMARY INSOMNIA: ICD-10-CM

## 2023-06-27 DIAGNOSIS — F41.9 ANXIETY: Primary | ICD-10-CM

## 2023-06-27 PROCEDURE — 1160F RVW MEDS BY RX/DR IN RCRD: CPT | Mod: CPTII,95,, | Performed by: NURSE PRACTITIONER

## 2023-06-27 PROCEDURE — 1160F PR REVIEW ALL MEDS BY PRESCRIBER/CLIN PHARMACIST DOCUMENTED: ICD-10-PCS | Mod: CPTII,95,, | Performed by: NURSE PRACTITIONER

## 2023-06-27 PROCEDURE — 1159F PR MEDICATION LIST DOCUMENTED IN MEDICAL RECORD: ICD-10-PCS | Mod: CPTII,95,, | Performed by: NURSE PRACTITIONER

## 2023-06-27 PROCEDURE — 1159F MED LIST DOCD IN RCRD: CPT | Mod: CPTII,95,, | Performed by: NURSE PRACTITIONER

## 2023-06-27 PROCEDURE — 99212 PR OFFICE/OUTPT VISIT, EST, LEVL II, 10-19 MIN: ICD-10-PCS | Mod: 95,,, | Performed by: NURSE PRACTITIONER

## 2023-06-27 PROCEDURE — 99212 OFFICE O/P EST SF 10 MIN: CPT | Mod: 95,,, | Performed by: NURSE PRACTITIONER

## 2023-06-27 NOTE — ASSESSMENT & PLAN NOTE
Chronic, stable  Continue Hydroxyzine, Stop trazodone due to nightmares.      Avoid caffeine, alcohol and stimulants. Do not use illicit drugs.  Practice positive phrases and repeat throughout the day.  Try yoga, lavender scents or Chamomile tea to promote relaxation.  Set healthy boundaries, avoid people and conversations that increase stress.  Avoid caffeinated beverages after lunch  Avoid alcohol near bedtime (eg, late afternoon and evening)  Avoid smoking or other nicotine intake, particularly during the evening  Exercise regularly for at least 20 minutes, preferably more than four to five hours prior to bedtime  Avoid daytime naps, especially if they are longer than 20 to 30 minutes or occur late in the day  Resolve concerns or worries before bedtime  Try not to force sleep    Sleep Hygiene Techniques: Sleep hygiene refers to actions that tend to improve and maintain good sleep  Power down electronic devices at least one hour prior to bedtime.  Keep room dark; use eye mask or relaxation sound machine to promote rest.  Sleep as long as necessary to feel rested (usually seven to eight hours for adults) and then get out of bed  Maintain a regular sleep schedule, particularly a regular wake-up time in the morning

## 2023-06-27 NOTE — ASSESSMENT & PLAN NOTE
Chronic, stable  Continue lexapro at 10mg po daily  Practice deep breathing or abdominal breathing exercises when anxiety occurs.  Exercise daily. Get sunlight daily.  Avoid caffeine, alcohol and stimulants.  Practice positive phrases and repeat throughout the day, yoga, lavender scents or Chamomile tea will help anxiety.  Set healthy boundaries, avoid people and conversations that increase stress.  Reports any symptoms of suicidal or homicidal ideations immediately, if clinic is closed go to nearest emergency room.

## 2023-06-27 NOTE — PROGRESS NOTES
Patient Name: Sada Cruz   : 2000  MRN: [id number]     SUBJECTIVE DATA:    CHIEF COMPLAINT:   Sdaa Cruz is a 23 y.o. female who presents to clinic today with FU insomnia, med assessment of Hydroxyzine.        HPI:  23:  FU insomnia. Trialed Hydroxyzine and increased her Lexapro to 10 mg. Is only taking 25 mg Hydroxyzine for now.  Since stopped taking Trazodone has not had nightmares. Has appt with psych NP 23.     Audio Only Telehealth Visit  The patient location is: [place]  Visit type: Virtual visit with audio only (telephone)    Total time spent with patient: 15 min including time spent talking to the patient about her medication and ascertaining efficacy, reviewing old record, preparing chart for call, med reconciliation, and follow up care.   The reason for the audio only service rather than synchronous audio and video virtual visit was related to technical difficulties or patient preference/necessity.  Each patient to whom I provide medical services by telemedicine is:  (1) informed of the relationship between the physician and patient and the respective role of any other health care provider with respect to management of the patient; and (2) notified that they may decline to receive medical services by telemedicine and may withdraw from such care at any time. Patient verbally consented to receive this service via voice-only telephone call.   This service was not originating from a related E/M service provided within the previous 7 days nor will  to an E/M service or procedure within the next 24 hours or my soonest available appointment.  Prevailing standard of care was able to be met in this audio-only visit.         23:  FU insomnia, trazodone was causing panic attacks and nightmares, she stopped on her own.  Has had 1 nightmare since that time.  Melatonin does not work.  Trazodone has failed.  Left ear with muffled sounds and states it is clogged.  Also, she states she forgets to  take Buspirone which is maxed out at 30mg po BID.  She does take Lexapro daily.    Pt has transportation issues getting to and from appointments.  She is here today with grandmother who is her only source of transportation from [place].  She has been given number to [facility] to arrange transport.  Resource list given to find counselor.     2/28/23 : 2 week FU med assessment, treatment for insomnia last visit.  Also ordered UA which was not done. She is having trouble falling asleep but is staying asleep once she does fall asleep.  Tried 1/2 dose first night but it did not work with 1/2. So she took a whole and it worked better. Has not tried 100mg yet but will tonight.      2/16/23:  Follow up 6 months, insomnia (trouble falling and staying alseep), needs refills.  Is able to fall asleep eventually but is up at 2 or 3 am.  Last 3 months has had this issue.  Is taking anxiety meds as directed.  Has tried Melatonin 3 years ago but was taking every n ght and body got used to it so it does not do anything for her anymore.  Denies sleep apnea symptoms discussed via call today.  Anxiety is well-controlled on Buspirone and Lexapro. Still with occasional nausea after having GB removed.  Is improved but not resolved.  No FU with GI noted.      8/18/22: FU visit for assessment of increase in Buspirone and Lexapro.    Lexapro working well and Buspirone increase has helped her.  She has no c/o depression or anxiety today. Is smiling during visit.  States she feels a lot better since her gall bladder was removed recently.  Does have FU scheduled with surgeon at [facility].   States one of her incisions got infected and was not healing correctly.  She is having to pack wound currently. Voices no other complaints today. Most recent labs done in June reviewed and are unremarkable.   Has been to ortho for her ankle.  Issue seems resolved.    Needs refill on her Lexapro and her Buspirone.     5/20/22:  FU 1 month Anxiety  Patient was started on  "Lexapro at last visit 1 month ago.  She presents today for follow-up and assessment of Lexapro with buspirone.  Patient states Lexapro is working.  Today she also complains of a left ankle sprain.  She initially sprained her ankle in January.  Continued pain to left lateral foot right above the left lateral malleolus.  Denies swelling.  x-ray performed in January at [facility].  No acute abnormality of left ankle demonstrated on x-ray.  Labs in March show FLP with Chol 201, HDL 51, Trig 76, . All other labs unremarkable.     4/21/22: FU  Anxiety: DIMPLE 5 today. Thinks needs increase of Buspirone.  Had period last two days of January then went 41 days without one. pregnancy test today is negative.  Nausea: still has issues with nausea.     1/21/22: 3 month FU  Light period in December but not spotting or full blown, no birth control since June.  Unable to get her Buspirone 15mg TID due to pharmacy issues.   GERD, omeprazole refilled     11/29/21: Urgent Care Follow-up  Went to Urgent Care with symptoms of pregnancy and both urine and serum tests negative.  Hx Anxiety, recently seen 10/5 and put on Buspirone 10mg po TID.   Stopped OCP in June, was having regular periods after stopping until last month. Had "spotting" at 10 days late, then spotted for 1.5 days at the end of October. Reports nausea in the morning and at bedtime, denies vomiting. Reports intermittent generalized abdominal aching. Denies diarrhea, constipation, or vomiting.  Next GYN Appt here 8/22.    10/5/2021: 1 month follow-up of anxiety.  This is a telehealth visit note. Patient was treated using telehealth, real time audio, video, or both according to [facility] protocols. Sophia RODRIGUEZ FNP-C, conducted the visit from location identified below. The patient participated in the visit at a non-OU location selected by the patient (or patient's representative), identified below. I am licensed in the [facility]. The patient (or patient's " "representative) stated that they understood and accepted the privacy and security risks to their information at their location and has consented to telephone visit.  Patient was located at patient's home.  Sophia RODRIGUEZ FNP-C, was located at [facility] [address] [address], [place], [place].  Anxiety: Buspirone TID prescribed last visit. less agitation and not freaking out as much. She has no complaints and states she is doing well today.    9/8/2021: This is a telehealth visit note. Patient was treated using telehealth, real time audio, video, or both according to [facility] protocols. Sophia RODRIGUEZ FNP-C, conducted the visit from location identified below. The patient participated in the visit at a non-[facility] location selected by the patient (or patient's representative), identified below. I am licensed in the [facility]. The patient (or patient's representative) stated that they understood and accepted the privacy and security risks to their information at their location and has consented to telephone visit.  Patient was located at patient's home.  Sophia RODRIGUEZ FNP-C, was located at [address] [address], [place], [place].  Today's visit is follow-up from initial visit.  Was trying to conceive at last visit pregnancy test was negative. She also had one done 8/3 which was negative. Labs reviewed from last visit all WNL.  pt states she has been feeling a lot of anxiety lately and states she has been "freaking out" a lot. neighbor started an argument with her boyfriend and she had bad chest pain and felt anxious because they were arguing. She has mood swings which are normal but no increased agitation, only c/o stress and feelings of worry. She has been off of birth control x 2 months. Has not been able to conceive.    6/1/2021: Complaints today: Here to establish care.  Believes she is pregnant. LMP 5/21. Stopped BCP 2 weeks ago. Is trying to conceive. has nausea before bed and in " AM.  Cancer Screening:  PAP: 2019: normal needs referral to GYN here.  Vaccines:  Tetanus/Tdap: UTD          ALLERGIES:   Review of patient's allergies indicates:   Allergen Reactions    Trazodone Other (See Comments)     Nightmares         ROS:  Review of Systems   HENT:  Negative for hearing loss.    Eyes:  Negative for discharge.   Respiratory:  Negative for wheezing.    Cardiovascular:  Negative for chest pain and palpitations.   Gastrointestinal:  Negative for blood in stool, constipation, diarrhea and vomiting.   Genitourinary:  Negative for dysuria and hematuria.   Musculoskeletal:  Negative for neck pain.   Neurological:  Positive for headaches. Negative for weakness.   Endo/Heme/Allergies:  Negative for polydipsia.       OBJECTIVE DATA:  Vital signs  There were no vitals filed for this visit.   There is no height or weight on file to calculate BMI.    PHYSICAL EXAM:   Physical Exam  Vitals and nursing note reviewed.   Neurological:      Mental Status: She is alert.   Psychiatric:         Attention and Perception: Attention normal.         Mood and Affect: Mood normal.         Speech: Speech normal.         Behavior: Behavior normal. Behavior is cooperative.         Thought Content: Thought content normal.         Cognition and Memory: Cognition normal.         Judgment: Judgment normal.        ASSESSMENT/PLAN:  1. Anxiety  Assessment & Plan:  Chronic, stable  Continue lexapro at 10mg po daily  Practice deep breathing or abdominal breathing exercises when anxiety occurs.  Exercise daily. Get sunlight daily.  Avoid caffeine, alcohol and stimulants.  Practice positive phrases and repeat throughout the day, yoga, lavender scents or Chamomile tea will help anxiety.  Set healthy boundaries, avoid people and conversations that increase stress.  Reports any symptoms of suicidal or homicidal ideations immediately, if clinic is closed go to nearest emergency room.          2. Primary insomnia  Assessment &  Plan:  Chronic, stable  Continue Hydroxyzine, Stop trazodone due to nightmares.      Avoid caffeine, alcohol and stimulants. Do not use illicit drugs.  Practice positive phrases and repeat throughout the day.  Try yoga, lavender scents or Chamomile tea to promote relaxation.  Set healthy boundaries, avoid people and conversations that increase stress.  Avoid caffeinated beverages after lunch  Avoid alcohol near bedtime (eg, late afternoon and evening)  Avoid smoking or other nicotine intake, particularly during the evening  Exercise regularly for at least 20 minutes, preferably more than four to five hours prior to bedtime  Avoid daytime naps, especially if they are longer than 20 to 30 minutes or occur late in the day  Resolve concerns or worries before bedtime  Try not to force sleep    Sleep Hygiene Techniques: Sleep hygiene refers to actions that tend to improve and maintain good sleep  Power down electronic devices at least one hour prior to bedtime.  Keep room dark; use eye mask or relaxation sound machine to promote rest.  Sleep as long as necessary to feel rested (usually seven to eight hours for adults) and then get out of bed  Maintain a regular sleep schedule, particularly a regular wake-up time in the morning               RESULTS:  No results found for this or any previous visit (from the past 1008 hour(s)).      Follow Up:  Follow up in about 3 months (around 9/27/2023) for Wellness; Insomnia, anxiety.               This note was created with the assistance of a voice recognition software or phone dictation. There may be transcription errors as a result of using this technology however minimal. Effort has been made to assure accuracy of transcription but any obvious errors or omissions should be clarified with the author of the document

## 2023-08-29 ENCOUNTER — OFFICE VISIT (OUTPATIENT)
Dept: BEHAVIORAL HEALTH | Facility: CLINIC | Age: 23
End: 2023-08-29
Payer: MEDICAID

## 2023-08-29 ENCOUNTER — OFFICE VISIT (OUTPATIENT)
Dept: FAMILY MEDICINE | Facility: CLINIC | Age: 23
End: 2023-08-29
Payer: MEDICAID

## 2023-08-29 VITALS
HEIGHT: 67 IN | WEIGHT: 231 LBS | BODY MASS INDEX: 36.26 KG/M2 | DIASTOLIC BLOOD PRESSURE: 68 MMHG | RESPIRATION RATE: 18 BRPM | TEMPERATURE: 98 F | HEART RATE: 76 BPM | SYSTOLIC BLOOD PRESSURE: 126 MMHG

## 2023-08-29 VITALS
WEIGHT: 203.81 LBS | HEART RATE: 76 BPM | SYSTOLIC BLOOD PRESSURE: 126 MMHG | BODY MASS INDEX: 31.99 KG/M2 | HEIGHT: 67 IN | DIASTOLIC BLOOD PRESSURE: 68 MMHG | TEMPERATURE: 98 F

## 2023-08-29 DIAGNOSIS — F51.01 PRIMARY INSOMNIA: ICD-10-CM

## 2023-08-29 DIAGNOSIS — F41.9 ANXIETY: ICD-10-CM

## 2023-08-29 DIAGNOSIS — Z12.4 ENCOUNTER FOR PAPANICOLAOU SMEAR OF CERVIX: Primary | ICD-10-CM

## 2023-08-29 DIAGNOSIS — F41.1 GAD (GENERALIZED ANXIETY DISORDER): Chronic | ICD-10-CM

## 2023-08-29 DIAGNOSIS — F51.4 NIGHT TERRORS, ADULT: Chronic | ICD-10-CM

## 2023-08-29 DIAGNOSIS — F33.1 MODERATE EPISODE OF RECURRENT MAJOR DEPRESSIVE DISORDER: Chronic | ICD-10-CM

## 2023-08-29 DIAGNOSIS — E78.00 HYPERCHOLESTEROLEMIA: ICD-10-CM

## 2023-08-29 DIAGNOSIS — N92.6 MISSED PERIOD: ICD-10-CM

## 2023-08-29 DIAGNOSIS — R11.0 CHRONIC NAUSEA: ICD-10-CM

## 2023-08-29 PROBLEM — H61.22 IMPACTED CERUMEN OF LEFT EAR: Status: RESOLVED | Noted: 2023-06-12 | Resolved: 2023-08-29

## 2023-08-29 LAB
B-HCG UR QL: NEGATIVE
CTP QC/QA: YES

## 2023-08-29 PROCEDURE — 3074F PR MOST RECENT SYSTOLIC BLOOD PRESSURE < 130 MM HG: ICD-10-PCS | Mod: CPTII,,, | Performed by: NURSE PRACTITIONER

## 2023-08-29 PROCEDURE — 3078F PR MOST RECENT DIASTOLIC BLOOD PRESSURE < 80 MM HG: ICD-10-PCS | Mod: CPTII,,, | Performed by: NURSE PRACTITIONER

## 2023-08-29 PROCEDURE — 3044F HG A1C LEVEL LT 7.0%: CPT | Mod: CPTII,,, | Performed by: NURSE PRACTITIONER

## 2023-08-29 PROCEDURE — 3074F SYST BP LT 130 MM HG: CPT | Mod: CPTII,,, | Performed by: NURSE PRACTITIONER

## 2023-08-29 PROCEDURE — 3008F BODY MASS INDEX DOCD: CPT | Mod: CPTII,,, | Performed by: NURSE PRACTITIONER

## 2023-08-29 PROCEDURE — 1159F PR MEDICATION LIST DOCUMENTED IN MEDICAL RECORD: ICD-10-PCS | Mod: CPTII,,, | Performed by: NURSE PRACTITIONER

## 2023-08-29 PROCEDURE — 99214 OFFICE O/P EST MOD 30 MIN: CPT | Mod: 25,S$PBB,, | Performed by: NURSE PRACTITIONER

## 2023-08-29 PROCEDURE — 87591 N.GONORRHOEAE DNA AMP PROB: CPT

## 2023-08-29 PROCEDURE — 81025 URINE PREGNANCY TEST: CPT | Mod: PBBFAC,PN | Performed by: NURSE PRACTITIONER

## 2023-08-29 PROCEDURE — 3008F PR BODY MASS INDEX (BMI) DOCUMENTED: ICD-10-PCS | Mod: CPTII,,, | Performed by: NURSE PRACTITIONER

## 2023-08-29 PROCEDURE — 1160F PR REVIEW ALL MEDS BY PRESCRIBER/CLIN PHARMACIST DOCUMENTED: ICD-10-PCS | Mod: CPTII,,, | Performed by: NURSE PRACTITIONER

## 2023-08-29 PROCEDURE — 99214 PR OFFICE/OUTPT VISIT, EST, LEVL IV, 30-39 MIN: ICD-10-PCS | Mod: 25,S$PBB,, | Performed by: NURSE PRACTITIONER

## 2023-08-29 PROCEDURE — 1159F MED LIST DOCD IN RCRD: CPT | Mod: CPTII,,, | Performed by: NURSE PRACTITIONER

## 2023-08-29 PROCEDURE — 88174 CYTOPATH C/V AUTO IN FLUID: CPT | Performed by: NURSE PRACTITIONER

## 2023-08-29 PROCEDURE — 3044F PR MOST RECENT HEMOGLOBIN A1C LEVEL <7.0%: ICD-10-PCS | Mod: CPTII,,, | Performed by: NURSE PRACTITIONER

## 2023-08-29 PROCEDURE — 87491 CHLMYD TRACH DNA AMP PROBE: CPT

## 2023-08-29 PROCEDURE — 87661 TRICHOMONAS VAGINALIS AMPLIF: CPT

## 2023-08-29 PROCEDURE — 99213 OFFICE O/P EST LOW 20 MIN: CPT | Mod: PBBFAC,PN | Performed by: NURSE PRACTITIONER

## 2023-08-29 PROCEDURE — 99215 PR OFFICE/OUTPT VISIT, EST, LEVL V, 40-54 MIN: ICD-10-PCS | Mod: S$PBB,,, | Performed by: NURSE PRACTITIONER

## 2023-08-29 PROCEDURE — 3078F DIAST BP <80 MM HG: CPT | Mod: CPTII,,, | Performed by: NURSE PRACTITIONER

## 2023-08-29 PROCEDURE — 99214 OFFICE O/P EST MOD 30 MIN: CPT | Mod: PBBFAC,27,PN | Performed by: NURSE PRACTITIONER

## 2023-08-29 PROCEDURE — 99215 OFFICE O/P EST HI 40 MIN: CPT | Mod: S$PBB,,, | Performed by: NURSE PRACTITIONER

## 2023-08-29 PROCEDURE — 1160F RVW MEDS BY RX/DR IN RCRD: CPT | Mod: CPTII,,, | Performed by: NURSE PRACTITIONER

## 2023-08-29 PROCEDURE — 87511 GARDNER VAG DNA AMP PROBE: CPT

## 2023-08-29 PROCEDURE — 87529 HSV DNA AMP PROBE: CPT

## 2023-08-29 PROCEDURE — 87481 CANDIDA DNA AMP PROBE: CPT

## 2023-08-29 RX ORDER — PRAZOSIN HYDROCHLORIDE 1 MG/1
1 CAPSULE ORAL NIGHTLY
Qty: 60 CAPSULE | Refills: 3 | Status: SHIPPED | OUTPATIENT
Start: 2023-08-29 | End: 2023-09-22

## 2023-08-29 RX ORDER — ESCITALOPRAM OXALATE 20 MG/1
20 TABLET ORAL DAILY
Qty: 30 TABLET | Refills: 3 | Status: SHIPPED | OUTPATIENT
Start: 2023-08-29 | End: 2024-01-23 | Stop reason: SDUPTHER

## 2023-08-29 NOTE — ASSESSMENT & PLAN NOTE
Continue hydroxyzine. Start Prazosin as directed  Avoid caffeine, alcohol and stimulants. Do not use illicit drugs.  Practice positive phrases and repeat throughout the day.  Try yoga, lavender scents or Chamomile tea to promote relaxation.  Set healthy boundaries, avoid people and conversations that increase stress.  Avoid caffeinated beverages after lunch  Avoid alcohol near bedtime (eg, late afternoon and evening)  Avoid smoking or other nicotine intake, particularly during the evening  Exercise regularly for at least 20 minutes, preferably more than four to five hours prior to bedtime  Avoid daytime naps, especially if they are longer than 20 to 30 minutes or occur late in the day  Resolve concerns or worries before bedtime  Try not to force sleep    Sleep Hygiene Techniques: Sleep hygiene refers to actions that tend to improve and maintain good sleep  Power down electronic devices at least one hour prior to bedtime.  Keep room dark; use eye mask or relaxation sound machine to promote rest.  Sleep as long as necessary to feel rested (usually seven to eight hours for adults) and then get out of bed  Maintain a regular sleep schedule, particularly a regular wake-up time in the morning

## 2023-08-29 NOTE — ASSESSMENT & PLAN NOTE
Now followed by psych NP whom she saw this morning. lexapro was increased and she was started on Prazosin.  Her Buspirone was increased.  Starting counseling tomorrow.     Practice deep breathing or abdominal breathing exercises when anxiety occurs.  Exercise daily. Get sunlight daily.  Avoid caffeine, alcohol and stimulants.  Practice positive phrases and repeat throughout the day, yoga, lavender scents or Chamomile tea will help anxiety.  Set healthy boundaries, avoid people and conversations that increase stress.  Reports any symptoms of suicidal or homicidal ideations immediately, if clinic is closed go to nearest emergency room.

## 2023-08-29 NOTE — ASSESSMENT & PLAN NOTE
Chronic, stable  Stressed importance of dietary modifications. Follow a low cholesterol, low saturated fat diet with less that 200mg of cholesterol a day.  Avoid fried foods and high saturated fats (high saturated fats less than 7% of calories).  Add Flax Seed/Fish Oil supplements to diet. Increase dietary fiber.  Regular exercise can reduce LDL and raise HDL. Stressed importance of physical activity 5 times per week for 30 minutes per day.

## 2023-08-29 NOTE — PATIENT INSTRUCTIONS
Increase Lexapro to 20mg a day  2. Continue Buspar 30mg BID  3. Start Prazosin 1mg at bedtime for night terrors  4. 1:1 therapy   5. FU in 6 weeks
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
## 2023-08-29 NOTE — PROGRESS NOTES
Patient Name: Sada Cruz     : 2000    MRN: [id number]     Subjective:     Patient ID: Sada Cruz is a 23 y.o. female.    Chief Complaint:   Chief Complaint   Patient presents with    Gynecologic Exam        HPI: 23:  Presents for 3 month FU insomnia, anxiety, and PAP.  Is 3 days late on period and states she has been trying to get pregnant since .  Patient is now seeing mental health provider for her anxiety/depression, night terrors.  Last PAP in  showed atypical squamous cells.      23:  FU insomnia. Trialed Hydroxyzine and increased her Lexapro to 10 mg. Is only taking 25 mg Hydroxyzine for now.  Since stopped taking Trazodone has not had nightmares. Has appt with psych NP 23.     23:  FU insomnia, trazodone was causing panic attacks and nightmares, she stopped on her own.  Has had 1 nightmare since that time.  Melatonin does not work.  Trazodone has failed.  Left ear with muffled sounds and states it is clogged.  Also, she states she forgets to take Buspirone which is maxed out at 30mg po BID.  She does take Lexapro daily.    Pt has transportation issues getting to and from appointments.  She is here today with grandmother who is her only source of transportation from [place].  She has been given number to [facility] to arrange transport.  Resource list given to find counselor.     23 : 2 week FU med assessment, treatment for insomnia last visit.  Also ordered UA which was not done. She is having trouble falling asleep but is staying asleep once she does fall asleep.  Tried 1/2 dose first night but it did not work with 1/2. So she took a whole and it worked better. Has not tried 100mg yet but will tonight.      23:  Follow up 6 months, insomnia (trouble falling and staying alseep), needs refills.  Is able to fall asleep eventually but is up at 2 or 3 am.  Last 3 months has had this issue.  Is taking anxiety meds as directed.  Has tried Melatonin 3 years ago but was taking  every n ght and body got used to it so it does not do anything for her anymore.  Denies sleep apnea symptoms discussed via call today.  Anxiety is well-controlled on Buspirone and Lexapro. Still with occasional nausea after having GB removed.  Is improved but not resolved.  No FU with GI noted.      8/18/22: FU visit for assessment of increase in Buspirone and Lexapro.    Lexapro working well and Buspirone increase has helped her.  She has no c/o depression or anxiety today. Is smiling during visit.  States she feels a lot better since her gall bladder was removed recently.  Does have FU scheduled with surgeon at [facility].   States one of her incisions got infected and was not healing correctly.  She is having to pack wound currently. Voices no other complaints today. Most recent labs done in June reviewed and are unremarkable.   Has been to ortho for her ankle.  Issue seems resolved.    Needs refill on her Lexapro and her Buspirone.     5/20/22:  FU 1 month Anxiety  Patient was started on Lexapro at last visit 1 month ago.  She presents today for follow-up and assessment of Lexapro with buspirone.  Patient states Lexapro is working.  Today she also complains of a left ankle sprain.  She initially sprained her ankle in January.  Continued pain to left lateral foot right above the left lateral malleolus.  Denies swelling.  x-ray performed in January at [facility].  No acute abnormality of left ankle demonstrated on x-ray.  Labs in March show FLP with Chol 201, HDL 51, Trig 76, . All other labs unremarkable.     4/21/22: FU  Anxiety: DIMPLE 5 today. Thinks needs increase of Buspirone.  Had period last two days of January then went 41 days without one. pregnancy test today is negative.  Nausea: still has issues with nausea.     1/21/22: 3 month FU  Light period in December but not spotting or full blown, no birth control since June.  Unable to get her Buspirone 15mg TID due to pharmacy issues.   GERD, omeprazole  "refilled     11/29/21: Urgent Care Follow-up  Went to Urgent Care with symptoms of pregnancy and both urine and serum tests negative.  Hx Anxiety, recently seen 10/5 and put on Buspirone 10mg po TID.   Stopped OCP in June, was having regular periods after stopping until last month. Had "spotting" at 10 days late, then spotted for 1.5 days at the end of October. Reports nausea in the morning and at bedtime, denies vomiting. Reports intermittent generalized abdominal aching. Denies diarrhea, constipation, or vomiting.  Next GYN Appt here 8/22.    10/5/2021: 1 month follow-up of anxiety.  This is a telehealth visit note. Patient was treated using telehealth, real time audio, video, or both according to [facility] protocols. Sophia RODRIGUEZ FNP-C, conducted the visit from location identified below. The patient participated in the visit at a non-OU location selected by the patient (or patient's representative), identified below. I am licensed in the [facility]. The patient (or patient's representative) stated that they understood and accepted the privacy and security risks to their information at their location and has consented to telephone visit.  Patient was located at patient's home.  Sophia RODRIGUEZ FNP-C, was located at [facility] [address] [address], [place], [place].  Anxiety: Buspirone TID prescribed last visit. less agitation and not freaking out as much. She has no complaints and states she is doing well today.    9/8/2021: This is a telehealth visit note. Patient was treated using telehealth, real time audio, video, or both according to [facility] protocols. Sophia RODRIGUEZ FNP-C, conducted the visit from location identified below. The patient participated in the visit at a non-OU location selected by the patient (or patient's representative), identified below. I am licensed in the [facility]. The patient (or patient's representative) stated that they understood and accepted the privacy and " "security risks to their information at their location and has consented to telephone visit.  Patient was located at patient's home.  I, Sophia Dunne, LUIS-JENNIE, was located at [facility] , [place], [place].  Today's visit is follow-up from initial visit.  Was trying to conceive at last visit pregnancy test was negative. She also had one done 8/3 which was negative. Labs reviewed from last visit all WNL.  pt states she has been feeling a lot of anxiety lately and states she has been "freaking out" a lot. neighbor started an argument with her boyfriend and she had bad chest pain and felt anxious because they were arguing. She has mood swings which are normal but no increased agitation, only c/o stress and feelings of worry. She has been off of birth control x 2 months. Has not been able to conceive.    6/1/2021: Complaints today: Here to establish care.  Believes she is pregnant. LMP 5/21. Stopped BCP 2 weeks ago. Is trying to conceive. has nausea before bed and in AM.  Cancer Screening:  PAP: 2019: normal needs referral to GYN here.  Vaccines:  Tetanus/Tdap: UTD            ROS:      Review of Systems   Psychiatric/Behavioral:  Positive for depression. The patient is nervous/anxious and has insomnia.    All other systems reviewed and are negative.           History:     Past Medical History:   Diagnosis Date    Anxiety     GERD (gastroesophageal reflux disease)     Impacted cerumen of left ear 6/12/2023    Left ankle sprain 5/20/2022        Past Surgical History:   Procedure Laterality Date    CHOLECYSTECTOMY  2022    knee sx         History reviewed. No pertinent family history.     Social History     Tobacco Use    Smoking status: Never    Smokeless tobacco: Never   Substance and Sexual Activity    Alcohol use: Not Currently    Drug use: Never    Sexual activity: Yes     Partners: Male       Current Outpatient Medications   Medication Instructions    busPIRone (BUSPAR) 30 mg, Oral, 2 times daily    " "EScitalopram oxalate (LEXAPRO) 20 mg, Oral, Daily    hydrOXYzine pamoate (VISTARIL) 25 mg, Oral, Nightly    omeprazole (PRILOSEC) 40 mg, Oral, Every morning    ondansetron (ZOFRAN-ODT) 8 mg, Oral, Every 6 hours PRN    prazosin (MINIPRESS) 1 mg, Oral, Nightly    SF 5000 PLUS 1.1 % Crea USE IN PLACE OF REGULAR TOOTHPASTE, BRUSH ONTO TEETH FOR 2 MINUTES TWICE A DAY, SPIT OUT AND DO NOT RINSE, EAT OR DRINK FOR 30 MINUTES        Review of patient's allergies indicates:   Allergen Reactions    Trazodone Other (See Comments)     Nightmares       Objective:     Visit Vitals  /68 (BP Location: Left arm, Patient Position: Sitting, BP Method: Large (Automatic))   Pulse 76   Temp 97.5 °F (36.4 °C) (Oral)   Resp 18   Ht 5' 7" (1.702 m)   Wt 104.8 kg (231 lb)   LMP 07/30/2023   BMI 36.18 kg/m²       Physical Examination:     Physical Exam  Vitals and nursing note reviewed. Exam conducted with a chaperone present.   HENT:      Head: Normocephalic and atraumatic.   Cardiovascular:      Rate and Rhythm: Normal rate and regular rhythm.      Pulses: Normal pulses.      Heart sounds: Normal heart sounds.   Pulmonary:      Breath sounds: Normal breath sounds.   Genitourinary:     Exam position: Lithotomy position.      Vagina: Normal.      Cervix: Discharge present. No cervical motion tenderness, friability, lesion, erythema, cervical bleeding or eversion.      Uterus: Normal.       Adnexa: Right adnexa normal.      Rectum: Normal.   Musculoskeletal:         General: Normal range of motion.      Cervical back: Normal range of motion.   Skin:     General: Skin is warm and dry.   Neurological:      General: No focal deficit present.      Mental Status: She is alert and oriented to person, place, and time.   Psychiatric:         Mood and Affect: Mood normal.         Lab Results:     Chemistry:  Lab Results   Component Value Date     02/02/2023    K 4.8 02/02/2023    CHLORIDE 106 02/02/2023    BUN 11.4 02/02/2023    CREATININE " 0.84 02/02/2023    EGFRNORACEVR >60 02/02/2023    GLUCOSE 78 02/02/2023    CALCIUM 9.9 02/02/2023    ALKPHOS 70 02/02/2023    LABPROT 7.9 02/02/2023    ALBUMIN 4.6 02/02/2023    BILIDIR 0.3 06/07/2021    IBILI 0.40 06/07/2021    AST 17 02/02/2023    ALT 17 02/02/2023    TSH 2.024 02/02/2023    TSJMGV4PHKK 0.86 02/02/2023        Lab Results   Component Value Date    HGBA1C 5.1 02/02/2023        Hematology:  Lab Results   Component Value Date    WBC 5.9 02/02/2023    HGB 13.8 02/02/2023    HCT 41.3 02/02/2023     02/02/2023       Lipid Panel:  Lab Results   Component Value Date    CHOL 204 (H) 02/02/2023    HDL 51 02/02/2023    .00 (H) 02/02/2023    TRIG 59 02/02/2023    TOTALCHOLEST 4 02/02/2023        Urine:  Lab Results   Component Value Date    COLORUA Orange (A) 02/02/2023    APPEARANCEUA Turbid (A) 02/02/2023    SGUA 1.025 02/02/2023    PHUA 5.5 02/02/2023    PROTEINUA Negative 02/02/2023    GLUCOSEUA Normal 02/02/2023    KETONESUA Negative 02/02/2023    BLOODUA Negative 02/02/2023    NITRITESUA Negative 02/02/2023    LEUKOCYTESUR 500 (A) 02/02/2023    RBCUA 0-5 02/02/2023    WBCUA 6-10 (A) 02/02/2023    BACTERIA Few (A) 02/02/2023    SQEPUA Moderate (A) 02/02/2023    HYALINECASTS None Seen 02/02/2023        Assessment:          ICD-10-CM ICD-9-CM   1. Encounter for Papanicolaou smear of cervix  Z12.4 V76.2   2. Anxiety  F41.9 300.00   3. Chronic nausea  R11.0 787.02   4. Missed period  N92.6 626.4   5. Primary insomnia  F51.01 307.42   6. Hypercholesterolemia  E78.00 272.0        Plan:     1. Encounter for Papanicolaou smear of cervix  Assessment & Plan:  PAP obtained    Orders:  -     Liquid-Based Pap Smear, Screening Screening    2. Anxiety  Assessment & Plan:  Now followed by psych NP whom she saw this morning. lexapro was increased and she was started on Prazosin.  Her Buspirone was increased.  Starting counseling tomorrow.     Practice deep breathing or abdominal breathing exercises when  anxiety occurs.  Exercise daily. Get sunlight daily.  Avoid caffeine, alcohol and stimulants.  Practice positive phrases and repeat throughout the day, yoga, lavender scents or Chamomile tea will help anxiety.  Set healthy boundaries, avoid people and conversations that increase stress.  Reports any symptoms of suicidal or homicidal ideations immediately, if clinic is closed go to nearest emergency room.            3. Chronic nausea  Assessment & Plan:  Improved since removing gallbladder.        4. Missed period  Assessment & Plan:  UPT negative    Orders:  -     POCT Urine Pregnancy    5. Primary insomnia  Assessment & Plan:  Continue hydroxyzine. Start Prazosin as directed  Avoid caffeine, alcohol and stimulants. Do not use illicit drugs.  Practice positive phrases and repeat throughout the day.  Try yoga, lavender scents or Chamomile tea to promote relaxation.  Set healthy boundaries, avoid people and conversations that increase stress.  Avoid caffeinated beverages after lunch  Avoid alcohol near bedtime (eg, late afternoon and evening)  Avoid smoking or other nicotine intake, particularly during the evening  Exercise regularly for at least 20 minutes, preferably more than four to five hours prior to bedtime  Avoid daytime naps, especially if they are longer than 20 to 30 minutes or occur late in the day  Resolve concerns or worries before bedtime  Try not to force sleep    Sleep Hygiene Techniques: Sleep hygiene refers to actions that tend to improve and maintain good sleep  Power down electronic devices at least one hour prior to bedtime.  Keep room dark; use eye mask or relaxation sound machine to promote rest.  Sleep as long as necessary to feel rested (usually seven to eight hours for adults) and then get out of bed  Maintain a regular sleep schedule, particularly a regular wake-up time in the morning        6. Hypercholesterolemia  Assessment & Plan:  Chronic, stable  Stressed importance of dietary  modifications. Follow a low cholesterol, low saturated fat diet with less that 200mg of cholesterol a day.  Avoid fried foods and high saturated fats (high saturated fats less than 7% of calories).  Add Flax Seed/Fish Oil supplements to diet. Increase dietary fiber.  Regular exercise can reduce LDL and raise HDL. Stressed importance of physical activity 5 times per week for 30 minutes per day.                  Follow up in about 6 months (around 2/29/2024) for Wellness.    Future Appointments   Date Time Provider Department Center   10/13/2023 10:00 [facility]   2/29/2024  9:00 AM Sophia Dunne FNP [facility]        [facility] Thorne

## 2023-08-30 ENCOUNTER — TELEPHONE (OUTPATIENT)
Dept: FAMILY MEDICINE | Facility: CLINIC | Age: 23
End: 2023-08-30
Payer: MEDICAID

## 2023-08-30 NOTE — TELEPHONE ENCOUNTER
Please route to pharmacy that has been updated in chart  [facility] [address]  [place] [id number]    Thank you

## 2023-09-06 ENCOUNTER — TELEPHONE (OUTPATIENT)
Dept: FAMILY MEDICINE | Facility: CLINIC | Age: 23
End: 2023-09-06
Payer: MEDICAID

## 2023-09-06 LAB — PSYCHE PATHOLOGY RESULT: NORMAL

## 2023-09-18 ENCOUNTER — TELEPHONE (OUTPATIENT)
Dept: BEHAVIORAL HEALTH | Facility: CLINIC | Age: 23
End: 2023-09-18
Payer: MEDICAID

## 2023-09-18 NOTE — TELEPHONE ENCOUNTER
Called patient to discuss medication. She did not answer. Left a message explaining that dizziness could be a SE of the Prazosin as it is a BP medication. Suggested that she stand slowly if she wanted to continue the medication but that there is no harm in stopping the medication.

## 2023-09-18 NOTE — TELEPHONE ENCOUNTER
Spoke to pt states she started taking Prozosin 1 mg and its causing her to be light headed and nauseated.

## 2023-09-22 ENCOUNTER — OFFICE VISIT (OUTPATIENT)
Dept: BEHAVIORAL HEALTH | Facility: CLINIC | Age: 23
End: 2023-09-22
Payer: MEDICAID

## 2023-09-22 VITALS
DIASTOLIC BLOOD PRESSURE: 79 MMHG | SYSTOLIC BLOOD PRESSURE: 118 MMHG | HEIGHT: 67 IN | BODY MASS INDEX: 36.75 KG/M2 | TEMPERATURE: 98 F | HEART RATE: 82 BPM | WEIGHT: 234.13 LBS

## 2023-09-22 DIAGNOSIS — F41.1 GAD (GENERALIZED ANXIETY DISORDER): ICD-10-CM

## 2023-09-22 DIAGNOSIS — F33.1 MODERATE EPISODE OF RECURRENT MAJOR DEPRESSIVE DISORDER: Primary | ICD-10-CM

## 2023-09-22 PROCEDURE — 3074F PR MOST RECENT SYSTOLIC BLOOD PRESSURE < 130 MM HG: ICD-10-PCS | Mod: CPTII,,, | Performed by: NURSE PRACTITIONER

## 2023-09-22 PROCEDURE — 3044F HG A1C LEVEL LT 7.0%: CPT | Mod: CPTII,,, | Performed by: NURSE PRACTITIONER

## 2023-09-22 PROCEDURE — 3044F PR MOST RECENT HEMOGLOBIN A1C LEVEL <7.0%: ICD-10-PCS | Mod: CPTII,,, | Performed by: NURSE PRACTITIONER

## 2023-09-22 PROCEDURE — 1160F RVW MEDS BY RX/DR IN RCRD: CPT | Mod: CPTII,,, | Performed by: NURSE PRACTITIONER

## 2023-09-22 PROCEDURE — 3074F SYST BP LT 130 MM HG: CPT | Mod: CPTII,,, | Performed by: NURSE PRACTITIONER

## 2023-09-22 PROCEDURE — 1159F MED LIST DOCD IN RCRD: CPT | Mod: CPTII,,, | Performed by: NURSE PRACTITIONER

## 2023-09-22 PROCEDURE — 99213 OFFICE O/P EST LOW 20 MIN: CPT | Mod: S$PBB,,, | Performed by: NURSE PRACTITIONER

## 2023-09-22 PROCEDURE — 3008F BODY MASS INDEX DOCD: CPT | Mod: CPTII,,, | Performed by: NURSE PRACTITIONER

## 2023-09-22 PROCEDURE — 99213 PR OFFICE/OUTPT VISIT, EST, LEVL III, 20-29 MIN: ICD-10-PCS | Mod: S$PBB,,, | Performed by: NURSE PRACTITIONER

## 2023-09-22 PROCEDURE — 3078F PR MOST RECENT DIASTOLIC BLOOD PRESSURE < 80 MM HG: ICD-10-PCS | Mod: CPTII,,, | Performed by: NURSE PRACTITIONER

## 2023-09-22 PROCEDURE — 99213 OFFICE O/P EST LOW 20 MIN: CPT | Mod: PBBFAC,PN | Performed by: NURSE PRACTITIONER

## 2023-09-22 PROCEDURE — 3078F DIAST BP <80 MM HG: CPT | Mod: CPTII,,, | Performed by: NURSE PRACTITIONER

## 2023-09-22 PROCEDURE — 1160F PR REVIEW ALL MEDS BY PRESCRIBER/CLIN PHARMACIST DOCUMENTED: ICD-10-PCS | Mod: CPTII,,, | Performed by: NURSE PRACTITIONER

## 2023-09-22 PROCEDURE — 3008F PR BODY MASS INDEX (BMI) DOCUMENTED: ICD-10-PCS | Mod: CPTII,,, | Performed by: NURSE PRACTITIONER

## 2023-09-22 PROCEDURE — 1159F PR MEDICATION LIST DOCUMENTED IN MEDICAL RECORD: ICD-10-PCS | Mod: CPTII,,, | Performed by: NURSE PRACTITIONER
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
## 2023-10-20 ENCOUNTER — OFFICE VISIT (OUTPATIENT)
Dept: BEHAVIORAL HEALTH | Facility: CLINIC | Age: 23
End: 2023-10-20
Payer: MEDICAID

## 2023-10-20 DIAGNOSIS — F41.1 GAD (GENERALIZED ANXIETY DISORDER): Chronic | ICD-10-CM

## 2023-10-20 DIAGNOSIS — F33.1 MODERATE EPISODE OF RECURRENT MAJOR DEPRESSIVE DISORDER: Primary | Chronic | ICD-10-CM

## 2023-10-20 DIAGNOSIS — F51.01 PRIMARY INSOMNIA: ICD-10-CM

## 2023-10-20 PROCEDURE — 1159F MED LIST DOCD IN RCRD: CPT | Mod: CPTII,NDTC,, | Performed by: NURSE PRACTITIONER

## 2023-10-20 PROCEDURE — 3044F HG A1C LEVEL LT 7.0%: CPT | Mod: CPTII,NDTC,, | Performed by: NURSE PRACTITIONER

## 2023-10-20 PROCEDURE — 99212 OFFICE O/P EST SF 10 MIN: CPT | Mod: S$PBB,NDTC,, | Performed by: NURSE PRACTITIONER

## 2023-10-20 PROCEDURE — 3044F PR MOST RECENT HEMOGLOBIN A1C LEVEL <7.0%: ICD-10-PCS | Mod: CPTII,NDTC,, | Performed by: NURSE PRACTITIONER

## 2023-10-20 PROCEDURE — 1159F PR MEDICATION LIST DOCUMENTED IN MEDICAL RECORD: ICD-10-PCS | Mod: CPTII,NDTC,, | Performed by: NURSE PRACTITIONER

## 2023-10-20 PROCEDURE — 1160F PR REVIEW ALL MEDS BY PRESCRIBER/CLIN PHARMACIST DOCUMENTED: ICD-10-PCS | Mod: CPTII,NDTC,, | Performed by: NURSE PRACTITIONER

## 2023-10-20 PROCEDURE — 99212 PR OFFICE/OUTPT VISIT, EST, LEVL II, 10-19 MIN: ICD-10-PCS | Mod: S$PBB,NDTC,, | Performed by: NURSE PRACTITIONER

## 2023-10-20 PROCEDURE — 1160F RVW MEDS BY RX/DR IN RCRD: CPT | Mod: CPTII,NDTC,, | Performed by: NURSE PRACTITIONER
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
## 2024-01-23 ENCOUNTER — OFFICE VISIT (OUTPATIENT)
Dept: BEHAVIORAL HEALTH | Facility: CLINIC | Age: 24
End: 2024-01-23
Payer: MEDICAID

## 2024-01-23 DIAGNOSIS — F51.01 PRIMARY INSOMNIA: ICD-10-CM

## 2024-01-23 DIAGNOSIS — F41.9 ANXIETY: ICD-10-CM

## 2024-01-23 DIAGNOSIS — F41.1 GAD (GENERALIZED ANXIETY DISORDER): Chronic | ICD-10-CM

## 2024-01-23 DIAGNOSIS — F33.1 MODERATE EPISODE OF RECURRENT MAJOR DEPRESSIVE DISORDER: Primary | Chronic | ICD-10-CM

## 2024-01-23 PROCEDURE — 1159F MED LIST DOCD IN RCRD: CPT | Mod: CPTII,NDTC,, | Performed by: NURSE PRACTITIONER

## 2024-01-23 PROCEDURE — 1160F RVW MEDS BY RX/DR IN RCRD: CPT | Mod: CPTII,NDTC,, | Performed by: NURSE PRACTITIONER

## 2024-01-23 PROCEDURE — 99212 OFFICE O/P EST SF 10 MIN: CPT | Mod: S$PBB,NDTC,, | Performed by: NURSE PRACTITIONER

## 2024-01-23 RX ORDER — BUSPIRONE HYDROCHLORIDE 30 MG/1
30 TABLET ORAL 2 TIMES DAILY
Qty: 180 TABLET | Refills: 1 | Status: SHIPPED | OUTPATIENT
Start: 2024-01-23

## 2024-01-23 RX ORDER — ESCITALOPRAM OXALATE 20 MG/1
20 TABLET ORAL DAILY
Qty: 90 TABLET | Refills: 1 | Status: SHIPPED | OUTPATIENT
Start: 2024-01-23
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
## 2024-05-08 ENCOUNTER — PATIENT MESSAGE (OUTPATIENT)
Dept: FAMILY MEDICINE | Facility: CLINIC | Age: 24
End: 2024-05-08
Payer: MEDICAID

## 2024-05-08 PROBLEM — E78.49 OTHER HYPERLIPIDEMIA: Status: ACTIVE | Noted: 2023-08-29
